# Patient Record
Sex: MALE | Race: WHITE | Employment: OTHER | URBAN - NONMETROPOLITAN AREA
[De-identification: names, ages, dates, MRNs, and addresses within clinical notes are randomized per-mention and may not be internally consistent; named-entity substitution may affect disease eponyms.]

---

## 2020-08-08 ENCOUNTER — HOSPITAL ENCOUNTER (INPATIENT)
Age: 40
LOS: 9 days | Discharge: HOME | DRG: 885 | End: 2020-08-17
Attending: PSYCHIATRY & NEUROLOGY | Admitting: PSYCHIATRY & NEUROLOGY
Payer: OTHER GOVERNMENT

## 2020-08-08 PROBLEM — F32.3 MAJOR DEPRESSION WITH PSYCHOTIC FEATURES (HCC): Status: ACTIVE | Noted: 2020-08-08

## 2020-08-08 LAB
ACETAMINOPHEN LEVEL: < 5 UG/ML (ref 0–20)
ALBUMIN SERPL-MCNC: 4.9 G/DL (ref 3.5–5.1)
ALP BLD-CCNC: 112 U/L (ref 38–126)
ALT SERPL-CCNC: 25 U/L (ref 11–66)
AMPHETAMINE+METHAMPHETAMINE URINE SCREEN: NEGATIVE
ANION GAP SERPL CALCULATED.3IONS-SCNC: 13 MEQ/L (ref 8–16)
AST SERPL-CCNC: 33 U/L (ref 5–40)
BACTERIA: ABNORMAL /HPF
BARBITURATE QUANTITATIVE URINE: NEGATIVE
BASOPHILS # BLD: 0.9 %
BASOPHILS ABSOLUTE: 0.1 THOU/MM3 (ref 0–0.1)
BENZODIAZEPINE QUANTITATIVE URINE: NEGATIVE
BILIRUB SERPL-MCNC: 0.7 MG/DL (ref 0.3–1.2)
BILIRUBIN URINE: NEGATIVE
BLOOD, URINE: ABNORMAL
BUN BLDV-MCNC: 16 MG/DL (ref 7–22)
CALCIUM SERPL-MCNC: 9.4 MG/DL (ref 8.5–10.5)
CANNABINOID QUANTITATIVE URINE: NEGATIVE
CASTS 2: ABNORMAL /LPF
CASTS UA: ABNORMAL /LPF
CHARACTER, URINE: CLEAR
CHLORIDE BLD-SCNC: 104 MEQ/L (ref 98–111)
CO2: 25 MEQ/L (ref 23–33)
COCAINE METABOLITE QUANTITATIVE URINE: NEGATIVE
COLOR: YELLOW
CREAT SERPL-MCNC: 0.8 MG/DL (ref 0.4–1.2)
CRYSTALS, UA: ABNORMAL
EOSINOPHIL # BLD: 3.6 %
EOSINOPHILS ABSOLUTE: 0.3 THOU/MM3 (ref 0–0.4)
EPITHELIAL CELLS, UA: ABNORMAL /HPF
ERYTHROCYTE [DISTWIDTH] IN BLOOD BY AUTOMATED COUNT: 12.5 % (ref 11.5–14.5)
ERYTHROCYTE [DISTWIDTH] IN BLOOD BY AUTOMATED COUNT: 42.7 FL (ref 35–45)
ETHYL ALCOHOL, SERUM: < 0.01 %
GFR SERPL CREATININE-BSD FRML MDRD: > 90 ML/MIN/1.73M2
GLUCOSE BLD-MCNC: 94 MG/DL (ref 70–108)
GLUCOSE URINE: NEGATIVE MG/DL
HCT VFR BLD CALC: 44.6 % (ref 42–52)
HEMOGLOBIN: 15 GM/DL (ref 14–18)
IMMATURE GRANS (ABS): 0.02 THOU/MM3 (ref 0–0.07)
IMMATURE GRANULOCYTES: 0.3 %
KETONES, URINE: NEGATIVE
LEUKOCYTE ESTERASE, URINE: NEGATIVE
LYMPHOCYTES # BLD: 25.9 %
LYMPHOCYTES ABSOLUTE: 1.9 THOU/MM3 (ref 1–4.8)
MCH RBC QN AUTO: 31.3 PG (ref 26–33)
MCHC RBC AUTO-ENTMCNC: 33.6 GM/DL (ref 32.2–35.5)
MCV RBC AUTO: 93.1 FL (ref 80–94)
MISCELLANEOUS 2: ABNORMAL
MONOCYTES # BLD: 5.7 %
MONOCYTES ABSOLUTE: 0.4 THOU/MM3 (ref 0.4–1.3)
NITRITE, URINE: NEGATIVE
NUCLEATED RED BLOOD CELLS: 0 /100 WBC
OPIATES, URINE: NEGATIVE
OSMOLALITY CALCULATION: 284.1 MOSMOL/KG (ref 275–300)
OXYCODONE: NEGATIVE
PH UA: 6 (ref 5–9)
PHENCYCLIDINE QUANTITATIVE URINE: NEGATIVE
PLATELET # BLD: 299 THOU/MM3 (ref 130–400)
PMV BLD AUTO: 10.1 FL (ref 9.4–12.4)
POTASSIUM REFLEX MAGNESIUM: 3.8 MEQ/L (ref 3.5–5.2)
PROLACTIN: 4 NG/ML
PROTEIN UA: 30
RBC # BLD: 4.79 MILL/MM3 (ref 4.7–6.1)
RBC URINE: ABNORMAL /HPF
RENAL EPITHELIAL, UA: ABNORMAL
SALICYLATE, SERUM: < 0.3 MG/DL (ref 2–10)
SEG NEUTROPHILS: 63.6 %
SEGMENTED NEUTROPHILS ABSOLUTE COUNT: 4.8 THOU/MM3 (ref 1.8–7.7)
SODIUM BLD-SCNC: 142 MEQ/L (ref 135–145)
SPECIFIC GRAVITY, URINE: 1.02 (ref 1–1.03)
TOTAL PROTEIN: 8 G/DL (ref 6.1–8)
TSH SERPL DL<=0.05 MIU/L-ACNC: 2.02 UIU/ML (ref 0.4–4.2)
UROBILINOGEN, URINE: 0.2 EU/DL (ref 0–1)
WBC # BLD: 7.5 THOU/MM3 (ref 4.8–10.8)
WBC UA: ABNORMAL /HPF
YEAST: ABNORMAL

## 2020-08-08 PROCEDURE — 84146 ASSAY OF PROLACTIN: CPT

## 2020-08-08 PROCEDURE — 99284 EMERGENCY DEPT VISIT MOD MDM: CPT

## 2020-08-08 PROCEDURE — 85025 COMPLETE CBC W/AUTO DIFF WBC: CPT

## 2020-08-08 PROCEDURE — 84443 ASSAY THYROID STIM HORMONE: CPT

## 2020-08-08 PROCEDURE — 81001 URINALYSIS AUTO W/SCOPE: CPT

## 2020-08-08 PROCEDURE — G0480 DRUG TEST DEF 1-7 CLASSES: HCPCS

## 2020-08-08 PROCEDURE — 80053 COMPREHEN METABOLIC PANEL: CPT

## 2020-08-08 PROCEDURE — 80307 DRUG TEST PRSMV CHEM ANLYZR: CPT

## 2020-08-08 PROCEDURE — 36415 COLL VENOUS BLD VENIPUNCTURE: CPT

## 2020-08-08 PROCEDURE — 1240000000 HC EMOTIONAL WELLNESS R&B

## 2020-08-08 PROCEDURE — 99285 EMERGENCY DEPT VISIT HI MDM: CPT

## 2020-08-08 PROCEDURE — 6370000000 HC RX 637 (ALT 250 FOR IP): Performed by: PSYCHIATRY & NEUROLOGY

## 2020-08-08 RX ORDER — IBUPROFEN 400 MG/1
400 TABLET ORAL EVERY 6 HOURS PRN
Status: DISCONTINUED | OUTPATIENT
Start: 2020-08-08 | End: 2020-08-17 | Stop reason: HOSPADM

## 2020-08-08 RX ORDER — GABAPENTIN 100 MG/1
100 CAPSULE ORAL 3 TIMES DAILY
Status: DISCONTINUED | OUTPATIENT
Start: 2020-08-08 | End: 2020-08-17 | Stop reason: HOSPADM

## 2020-08-08 RX ORDER — ZIPRASIDONE MESYLATE 20 MG/ML
10 INJECTION, POWDER, LYOPHILIZED, FOR SOLUTION INTRAMUSCULAR 3 TIMES DAILY PRN
Status: DISCONTINUED | OUTPATIENT
Start: 2020-08-08 | End: 2020-08-17 | Stop reason: HOSPADM

## 2020-08-08 RX ORDER — TRAZODONE HYDROCHLORIDE 50 MG/1
50 TABLET ORAL NIGHTLY
Status: ON HOLD | COMMUNITY
End: 2020-08-17 | Stop reason: HOSPADM

## 2020-08-08 RX ORDER — ACETAMINOPHEN 325 MG/1
650 TABLET ORAL EVERY 4 HOURS PRN
Status: DISCONTINUED | OUTPATIENT
Start: 2020-08-08 | End: 2020-08-17 | Stop reason: HOSPADM

## 2020-08-08 RX ORDER — TRAZODONE HYDROCHLORIDE 50 MG/1
50 TABLET ORAL NIGHTLY PRN
Status: DISCONTINUED | OUTPATIENT
Start: 2020-08-08 | End: 2020-08-17 | Stop reason: HOSPADM

## 2020-08-08 RX ORDER — SERTRALINE HYDROCHLORIDE 100 MG/1
50 TABLET, FILM COATED ORAL DAILY
Status: ON HOLD | COMMUNITY
End: 2020-08-17 | Stop reason: HOSPADM

## 2020-08-08 RX ORDER — POLYETHYLENE GLYCOL 3350 17 G/17G
17 POWDER, FOR SOLUTION ORAL DAILY PRN
Status: DISCONTINUED | OUTPATIENT
Start: 2020-08-08 | End: 2020-08-17 | Stop reason: HOSPADM

## 2020-08-08 RX ORDER — HYDROXYZINE PAMOATE 50 MG/1
50 CAPSULE ORAL 2 TIMES DAILY PRN
Status: ON HOLD | COMMUNITY
End: 2020-08-17 | Stop reason: HOSPADM

## 2020-08-08 RX ORDER — RISPERIDONE 2 MG/1
2 TABLET, FILM COATED ORAL NIGHTLY
Status: ON HOLD | COMMUNITY
End: 2020-08-17 | Stop reason: HOSPADM

## 2020-08-08 RX ORDER — RISPERIDONE 2 MG/1
4 TABLET, FILM COATED ORAL NIGHTLY
Status: DISCONTINUED | OUTPATIENT
Start: 2020-08-08 | End: 2020-08-17 | Stop reason: HOSPADM

## 2020-08-08 RX ORDER — HYDROXYZINE HYDROCHLORIDE 25 MG/1
50 TABLET, FILM COATED ORAL 3 TIMES DAILY PRN
Status: DISCONTINUED | OUTPATIENT
Start: 2020-08-08 | End: 2020-08-17 | Stop reason: HOSPADM

## 2020-08-08 RX ORDER — SERTRALINE HYDROCHLORIDE 100 MG/1
100 TABLET, FILM COATED ORAL DAILY
Status: DISCONTINUED | OUTPATIENT
Start: 2020-08-08 | End: 2020-08-11

## 2020-08-08 RX ORDER — GABAPENTIN 100 MG/1
100 CAPSULE ORAL 3 TIMES DAILY
COMMUNITY

## 2020-08-08 RX ADMIN — HYDROXYZINE HYDROCHLORIDE 50 MG: 25 TABLET ORAL at 20:10

## 2020-08-08 RX ADMIN — RISPERIDONE 4 MG: 2 TABLET ORAL at 20:10

## 2020-08-08 RX ADMIN — SERTRALINE HYDROCHLORIDE 100 MG: 100 TABLET, FILM COATED ORAL at 20:10

## 2020-08-08 RX ADMIN — GABAPENTIN 100 MG: 100 CAPSULE ORAL at 20:10

## 2020-08-08 RX ADMIN — IBUPROFEN 400 MG: 400 TABLET, FILM COATED ORAL at 20:10

## 2020-08-08 SDOH — HEALTH STABILITY: MENTAL HEALTH: HOW OFTEN DO YOU HAVE A DRINK CONTAINING ALCOHOL?: NEVER

## 2020-08-08 ASSESSMENT — SLEEP AND FATIGUE QUESTIONNAIRES
DO YOU HAVE DIFFICULTY SLEEPING: YES
DO YOU HAVE DIFFICULTY SLEEPING: NO
DO YOU USE A SLEEP AID: NO
DIFFICULTY ARISING: YES
RESTFUL SLEEP: NO
DO YOU USE A SLEEP AID: NO
AVERAGE NUMBER OF SLEEP HOURS: 7
DIFFICULTY FALLING ASLEEP: YES
SLEEP PATTERN: DIFFICULTY FALLING ASLEEP;RESTLESSNESS;NIGHTMARES/TERRORS
DIFFICULTY STAYING ASLEEP: YES
AVERAGE NUMBER OF SLEEP HOURS: 7

## 2020-08-08 ASSESSMENT — PAIN DESCRIPTION - PROGRESSION: CLINICAL_PROGRESSION: NOT CHANGED

## 2020-08-08 ASSESSMENT — ENCOUNTER SYMPTOMS
SORE THROAT: 0
BACK PAIN: 0
SHORTNESS OF BREATH: 0
ABDOMINAL PAIN: 0
COUGH: 0
COLOR CHANGE: 0

## 2020-08-08 ASSESSMENT — PAIN - FUNCTIONAL ASSESSMENT: PAIN_FUNCTIONAL_ASSESSMENT: ACTIVITIES ARE NOT PREVENTED

## 2020-08-08 ASSESSMENT — PAIN DESCRIPTION - FREQUENCY: FREQUENCY: INTERMITTENT

## 2020-08-08 ASSESSMENT — PAIN DESCRIPTION - DESCRIPTORS: DESCRIPTORS: ACHING;DISCOMFORT

## 2020-08-08 ASSESSMENT — PAIN SCALES - GENERAL
PAINLEVEL_OUTOF10: 3
PAINLEVEL_OUTOF10: 3
PAINLEVEL_OUTOF10: 0

## 2020-08-08 ASSESSMENT — PATIENT HEALTH QUESTIONNAIRE - PHQ9
SUM OF ALL RESPONSES TO PHQ QUESTIONS 1-9: 21
SUM OF ALL RESPONSES TO PHQ QUESTIONS 1-9: 21

## 2020-08-08 ASSESSMENT — LIFESTYLE VARIABLES
HISTORY_ALCOHOL_USE: NO
HISTORY_ALCOHOL_USE: NO

## 2020-08-08 ASSESSMENT — PAIN DESCRIPTION - ONSET: ONSET: ON-GOING

## 2020-08-08 ASSESSMENT — PAIN DESCRIPTION - PAIN TYPE: TYPE: CHRONIC PAIN

## 2020-08-08 ASSESSMENT — PAIN DESCRIPTION - ORIENTATION: ORIENTATION: LOWER

## 2020-08-08 ASSESSMENT — PAIN DESCRIPTION - LOCATION: LOCATION: BACK

## 2020-08-08 NOTE — ED NOTES
ED to inpatient nurses report    Chief Complaint   Patient presents with    Suicidal      Present to ED from home  LOC: alert and orientated to name, place, date  Vital signs   Vitals:    08/08/20 0956   BP: (!) 145/97   Pulse: 75   Resp: 15   Temp: 97.8 °F (36.6 °C)   TempSrc: Oral   SpO2: 99%   Weight: 160 lb (72.6 kg)   Height: 5' 10\" (1.778 m)      Oxygen Baseline 99    Current needs required room air   LDAs:    Mobility: Independent  Pending ED orders: None  Present condition: Stable    Electronically signed by Bela Robins RN on 8/8/2020 at 2:16 PM       Bela Robins RN  08/08/20 5652

## 2020-08-08 NOTE — FLOWSHEET NOTE
08/08/20 1820   Provider Notification   Reason for Communication Review case;New orders   Provider Name Dr. Daniela Danielle   Provider Notification Physician   Method of Communication Call   Response See orders   Spoke with Dr. Daniela Danielle about home medications, orders received resume home medications, increase Zoloft 100 mg daily and Risperdal 4 mg nightly.   IM Geodon 10 mg TID PRN for agitation

## 2020-08-08 NOTE — ED PROVIDER NOTES
[facility]  eMERGENCY dEPARTMENT eNCOUnter          CHIEF COMPLAINT       Chief Complaint   Patient presents with    Suicidal       Nurses Notes reviewed and I agree except as noted inthe HPI. HISTORY OF PRESENT ILLNESS    Chrystal Mckeon is a 36 y.o. male who presents to the Emergency Department for the evaluation of suicidal ideation and confusion. Patient states that he has prior history of anxiety and depression. He states he goes through periods of feeling better and approximately 1 month ago felt well and stopped taking his psychiatric medications. Family at bedside noticed a decline in his mental state approximately 10 days ago and found out he been off of his medications for 2 to 3 weeks at that point time. Approximately 8 to 9 days ago, they restarted the medications and when they consulted with his mental health provider at the [place] 4 days ago, medications were decreased. Family reports that he is typically on gabapentin 300 mg 3 times daily, sertraline 100 mg daily, hydroxyzine 50 mg 3 times daily as needed, trazodone 50 mg at bedtime and Risperdal 2 mg at bedtime. They discontinued the trazodone and Risperdal.  His gabapentin was decreased to 100 mg 3 times daily, sertraline 50 mg daily and hydroxyzine twice daily as needed, which he is only taking at bedtime. Patient reports he has been having confusion but is unable to really elaborate on this. He states that he is making bad decisions which she also cannot elaborate on. He states he finds out after making these decisions that they are bad and clarifies these decisions as \"normal stuff throughout the day. He cannot give any examples. He states that he feels bad in regards to these decisions and cannot say if these decisions have potential to cause harm to himself or others. He states is difficult for him to get any piece from these thoughts and he has been restless.   He has describes some cyclical control, pervasive thoughts to family that were persistent enough a few days ago that he got out of the car as it was moving through the drive-through, stating he felt like he was in a game. Aunt reports a lot of pacing behavior at night that seems to be worse shortly before he is due for his next dose of psychiatric medications. She reports approximately 8 hours at night of increased confusion, anxiety, paranoia, restlessness with multiple panic attacks that improves after he gets his morning medications. She also reports that the patient has tried getting into several different vehicles in their neighborhood, believing he could get down to [place] to see his children. Aunt reports that this has caused fear in at least 1 of the individuals when he tried to get into their car. He is supposed to go down there with his father this coming week for 2 weeks. Patient has been rearranging the garage throughout the day. He states that yesterday evening the thoughts were more persistent and he was very confused but cannot say what he was confused about, just that he did not know what to do. He cut his left wrist last night which according to family is the first time this is ever been done. He cannot say whether or not this was a suicide attempt but states he was having suicidal thoughts around the same time. He states he does not know if he had a plan and denies any current suicidal ideation. He has history of suicide attempt 1 year ago with cutting as well as 3 to 6 months ago when he put a garden hose in the exhaust of his car in the garage. He denies any homicidal ideation. He denies any auditory hallucinations and states he is unsure if he is having any visual hallucinations. Family does express concern that he seems to struggle to swallow pills and they are wondering if any of his medications may be available in a liquid form. No physical concerns or complaints at this time and no recent illness or injury.       The HPI was provided by the patient. REVIEW OF SYSTEMS     Review of Systems   Constitutional: Negative for chills and fever. HENT: Negative for sore throat. Respiratory: Negative for cough and shortness of breath. Gastrointestinal: Negative for abdominal pain. Musculoskeletal: Negative for back pain. Skin: Negative for color change. Neurological: Negative for headaches. Psychiatric/Behavioral: Positive for decreased concentration, self-injury and suicidal ideas. The patient is nervous/anxious and is hyperactive. PAST MEDICAL HISTORY    has a past medical history of \"Kidney problem\", Anxiety, Depression, and Neuropathy. SURGICAL HISTORY      has no past surgical history on file. CURRENT MEDICATIONS       Current Discharge Medication List      CONTINUE these medications which have NOT CHANGED    Details   gabapentin (NEURONTIN) 100 MG capsule Take 100 mg by mouth 3 times daily. sertraline (ZOLOFT) 100 MG tablet Take 50 mg by mouth daily      hydrOXYzine (VISTARIL) 50 MG capsule Take 50 mg by mouth 2 times daily as needed for Anxiety Patient taking only at bedtime             ALLERGIES     has No Known Allergies. FAMILY HISTORY     has no family status information on file. family history is not on file. SOCIAL HISTORY      reports that he has never smoked. He has never used smokeless tobacco. He reports previous alcohol use. He reports that he does not use drugs. PHYSICAL EXAM     INITIAL VITALS:  height is 5' 10\" (1.778 m) and weight is 160 lb (72.6 kg). His oral temperature is 97.8 °F (36.6 °C). His blood pressure is 145/97 (abnormal) and his pulse is 75. His respiration is 15 and oxygen saturation is 99%. Physical Exam  Vitals signs and nursing note reviewed. Constitutional:       Appearance: Normal appearance. HENT:      Head: Normocephalic and atraumatic. Eyes:      Conjunctiva/sclera: Conjunctivae normal.   Cardiovascular:      Rate and Rhythm: Normal rate. Vitals:    08/08/20 0956   BP: (!) 145/97   Pulse: 75   Resp: 15   Temp: 97.8 °F (36.6 °C)   TempSrc: Oral   SpO2: 99%   Weight: 160 lb (72.6 kg)   Height: 5' 10\" (1.778 m)      11:43 AM EDT: The patient was seen and evaluated. Patient presents for evaluation after suicidal thoughts that occurred yesterday evening with self injury by self-inflicted laceration to the left wrist that he is unsure whether he would qualify it as a suicidal attempt. He denies any current or active suicidal ideation on my examination. He expresses concern for confusion which he has had in the past with mental health issues. He denies any recent illness or injury and laboratory results are reassuring. He is evaluated by behavioral access center. Psychiatry recommendation was for inpatient psychiatric treatment and patient is willing to sign voluntary statement to proceed with the recommended plan of care. He was admitted to the psychiatric unit for further management. CRITICAL CARE:   None    CONSULTS:  RITA     PROCEDURES:  None    FINAL IMPRESSION      1. Major depression with psychotic features [facility])          DISPOSITION/PLAN   Admit    PATIENT REFERRED TO:  Alberto Barfield, NADER - CNP  [address]  [facility] [id number]  [phone]            DISCHARGEMEDICATIONS:  Current Discharge Medication List          (Please note that portions of this note were completedwith a voice recognition program.  Efforts were made to edit the dictations but occasionally words are mis-transcribed.)        Argenis Engle PA-C  08/08/20 2443

## 2020-08-08 NOTE — ED TRIAGE NOTES
Patient presents with suicidal thoughts. States he has had these thoughts before. States he had a rough night but would not explain any further. Patient states last night he did cut himself on his arm a little bit.

## 2020-08-08 NOTE — ED NOTES
Patient placed in safe room that is ligature resistant with continuous monitoring in place. Provider notified, requested an assessment by behavioral health . Patient belongings secured in a locked lockers outside of the room. Explained suicide prevention precautions to the patient including constant observer.         Adriane Rocha RN  08/08/20 1016
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
## 2020-08-08 NOTE — PROGRESS NOTES
Oriented to unit and plan of care, consents signed. Patient ask \"so how do you do it?, this writer replied \"do what\", patient states \"lethal injection, how do you do it?\", this writer tried to explain that we do not give lethal injections to patients\", patient continues to say \"I know you are going to murder me, it doesn't matter, just get it over with\". Patient then ask Stacey John do you put patients down? \", this writer explained again, we do not put patients down, we are here to keep you safe\". Patient asking if we \"gas patients, where are the gas masks\". Patient continues to be paranoid.      Evelin Willams RN

## 2020-08-08 NOTE — PROGRESS NOTES
Patients Aunt Lindsey phoned in stating the patient \"has not been coping on the medications, last Tuesday they cut his medications 1/3-1/2 and cut some out completely, [facility], appointment on Tuesday with MD, Wednesday he is going to see his children in [place], I will be going with him to [place], he won't be alone, he needs to started back on his medications as soon as possible so he doesn't get any worse\".

## 2020-08-08 NOTE — PROGRESS NOTES
[address] Karley Ferrera provider who states that patient is medically cleared. [address] [facility] perfect serve to contact psychiatry. Will wait for call back from Dr. Rachna Butler. 1248 Call back from Dr. Rachna Butler. He recommends inpatient treatment. Finishing discharged on 4E so patient will have a spot on the unit.  1304 Voluntary admission form signed. Chip 0280 requested a release [facility] information for [facility]. Patient agreeable. Contact [facility] and Chuy Davis who filled out RAUL. Now patient refusing to sign. RAUL in control room of safe rooms if patient changes his mind. 1346 Call to 4E. Discharges are complete but rooms still need to be cleaned. Heladio Munoz stated that they would be ready to receive report in about 20 minutes. Clinician will call back. [id number] [facility] requesting RAUL paper as patient states he will now sign it. RAUL signed by paper. Will send it up to 4E with the patient. 1414 Report given to Romelia Jain on 4E. Informed staff of RAUL for [place]. Called charge nurse who is going to have nurses report put in and then will transport upstairs. 1415 Updated medical provider. 1417 Informed patient that he would be transported upstairs shortly. Patient tearful. Explained that a nurse and campus security would escort him which is standard procedure. 1423 Patient being escorted to psychiatric unit at this time.

## 2020-08-08 NOTE — BH NOTE
Group Therapy Note    Date: 8/8/2020  Start Time: 1500  End Time:  1839  Number of Participants: 5    Type of Group: Psychoeducation      Group Goal:  Increase insight into self esteem, definition, who/what affects self esteem, why it is important, what is self talk, and made list of self esteem boosters versus busters. Notes:  Each patient was asked to give one positive trait they liked about self=n/a and   rate their self esteem today on scale of #1-10 with 10 the most= na.     Status After Intervention: n/a    Participation Level: Patient refused to attend (was getting admitted)    Participation Quality: n/a      Speech:  n/a      Thought Process/Content: n/a      Affective Functioning: n/a      Mood: n/a      Level of consciousness:  n/a      Response to Learning: n/a    Endings: N/A    Modes of Intervention: Education, Support and Socialization      Discipline Responsible: Registered Nurse      Signature:  ZARINA Powell RN MSN

## 2020-08-09 PROBLEM — F33.3 MDD (MAJOR DEPRESSIVE DISORDER), RECURRENT, SEVERE, WITH PSYCHOSIS (HCC): Chronic | Status: ACTIVE | Noted: 2020-08-08

## 2020-08-09 PROCEDURE — 1240000000 HC EMOTIONAL WELLNESS R&B

## 2020-08-09 PROCEDURE — 6370000000 HC RX 637 (ALT 250 FOR IP): Performed by: PSYCHIATRY & NEUROLOGY

## 2020-08-09 RX ADMIN — SERTRALINE HYDROCHLORIDE 100 MG: 100 TABLET, FILM COATED ORAL at 08:59

## 2020-08-09 RX ADMIN — RISPERIDONE 4 MG: 2 TABLET ORAL at 21:48

## 2020-08-09 RX ADMIN — HYDROXYZINE HYDROCHLORIDE 50 MG: 25 TABLET ORAL at 17:21

## 2020-08-09 RX ADMIN — GABAPENTIN 100 MG: 100 CAPSULE ORAL at 21:48

## 2020-08-09 ASSESSMENT — SLEEP AND FATIGUE QUESTIONNAIRES
SLEEP PATTERN: DIFFICULTY FALLING ASLEEP;RESTLESSNESS;NIGHTMARES/TERRORS
DIFFICULTY ARISING: YES
DIFFICULTY STAYING ASLEEP: YES
RESTFUL SLEEP: NO
DIFFICULTY FALLING ASLEEP: YES
DO YOU USE A SLEEP AID: NO
DO YOU HAVE DIFFICULTY SLEEPING: YES
AVERAGE NUMBER OF SLEEP HOURS: 7

## 2020-08-09 ASSESSMENT — LIFESTYLE VARIABLES: HISTORY_ALCOHOL_USE: NO

## 2020-08-09 ASSESSMENT — PAIN SCALES - GENERAL: PAINLEVEL_OUTOF10: 0

## 2020-08-09 ASSESSMENT — PATIENT HEALTH QUESTIONNAIRE - PHQ9: SUM OF ALL RESPONSES TO PHQ QUESTIONS 1-9: 21

## 2020-08-09 NOTE — PLAN OF CARE
Problem: Depressive Behavior With or Without Suicide Precautions:  Goal: Absence of self-harm  Description: Absence of self-harm  8/9/2020 1404 by Mark Salazar RN  Outcome: Met This Shift  Note: Remains free from self harming behaviors  8/9/2020 0009 by Thelma Varela LPN  Outcome: Met This Shift  Note: Pt free of self harm this shift      Problem: Depressive Behavior With or Without Suicide Precautions:  Goal: Ability to disclose and discuss suicidal ideas will improve  Description: Ability to disclose and discuss suicidal ideas will improve  8/9/2020 1404 by Mark Salazar RN  Outcome: Ongoing  Note: Pt denies suicidal thoughts, poor eye contact, minimal conversation and refused to answer assessment questions  8/9/2020 0009 by Thelma Varela LPN  Outcome: Met This Shift  Note: Denies SI this shift   Goal: Able to verbalize support systems  Description: Able to verbalize support systems  8/9/2020 1404 by Mark Salazar RN  Outcome: Ongoing  Note: Pt has an Aunt who calls to check on him but pt refused to speak with her on the phone  8/9/2020 0009 by Thelma Varela LPN  Outcome: Met This Shift  Note: Pt is able to verbalize support systems      Problem: Discharge Planning:  Goal: Discharged to appropriate level of care  Description: Discharged to appropriate level of care  8/9/2020 1404 by Mark Salazar RN  Outcome: Ongoing  Note: [facility] at 0930  8/9/2020 0009 by Thelma Varela LPN  Outcome: Ongoing  Note: D/C planning in process      Problem: Anxiety:  Goal: Level of anxiety will decrease  Description: Level of anxiety will decrease  8/9/2020 1404 by Mark Salazar RN  Outcome: Ongoing  Note: Pt refused to answer shift assessment questions regarding anxiety  8/9/2020 0009 by Thelma Varela LPN  Outcome: Met This Shift  Note: Pts states level of anxiety decreased this shift      Problem: KNOWLEDGE DEFICIT,EDUCATION,DISCHARGE PLAN  Goal: Knowledge - personal safety  8/9/2020 1404 by Sandi Mathis RN  Outcome: Ongoing  Note: Pt did not complete safety plan education provided on benefits and purpose of completing safety plan  8/9/2020 0009 by Briana Love LPN  Outcome: Ongoing  Note: Pt encouraged to complete personal safety plan prior to D/C      Problem:  Activity:  Goal: Physical symptoms of sleep deprivation will improve  Description: Physical symptoms of sleep deprivation will improve  8/9/2020 1404 by Sandi Mathis RN  Note: Pt slept 8.5 hrs continuous sleep  8/9/2020 0009 by Briana Love LPN  Outcome: Ongoing     Problem: Pain:  Goal: Pain level will decrease  Description: Pain level will decrease  Outcome: Ongoing  Goal: Control of acute pain  Description: Control of acute pain  Outcome: Ongoing  Note: No complaints voiced  Goal: Control of chronic pain  Description: Control of chronic pain  Outcome: Ongoing     Problem: Depressive Behavior With or Without Suicide Precautions:  Goal: Able to verbalize acceptance of life and situations over which he or she has no control  Description: Able to verbalize acceptance of life and situations over which he or she has no control  8/9/2020 1404 by Sandi Mathis RN  Outcome: Not Met This Shift  8/9/2020 0009 by Briana Love LPN  Outcome: Ongoing  Goal: Able to verbalize and/or display a decrease in depressive symptoms  Description: Able to verbalize and/or display a decrease in depressive symptoms  8/9/2020 1404 by Sandi Mathis RN  Outcome: Not Met This Shift  Note: Pt refused assessment/medications   8/9/2020 0009 by Briana Love LPN  Outcome: Ongoing  Goal: Patient specific goal  Description: Patient specific goal  8/9/2020 1404 by Sandi Mathis RN  Outcome: Not Met This Shift  Note: Pt declined to establish a short term is able to verbalize some reality based thinking this shift    Care plan reviewed with patient. Patient does not verbalize understanding of the plan of care and did not contribute to goal setting.

## 2020-08-09 NOTE — PATIENT CARE CONFERENCE
[facility]  Initial Interdisciplinary Treatment Plan NOTE    Review Date & Time: 8/9/2020 9:51 AM    Patient was in treatment team.  See Multidisciplinary Treatment Team sheet for participants. Admission Type:   Admission Type: Voluntary    Reason for admission:  Reason for Admission: \"did something I am ashamed off, I lost my family and kids, my job\"      Estimated Length of Stay Update:  3-5 days  Estimated Discharge Date Update: 3-5 days    PATIENT STRENGTHS:  Patient Strengths Strengths: No significant Physical Illness(per epic)  Patient Strengths and Limitations:Limitations: Difficult relationships / poor social skills, Tendency to isolate self, General negative or hopeless attitude about future/recovery, Hopeless about future(per epic)  Addictive Behavior:Addictive Behavior  In the past 3 months, have you felt or has someone told you that you have a problem with:  : None(per epic)  Do you have a history of Chemical Use?: No(per epic)  Do you have a history of Alcohol Use?: No(per epic)  Do you have a history of Street Drug Abuse?: No(per [facility])  Histroy of Prescripton Drug Abuse?: No(per epic)  Medical Problems:  Past Medical History:   Diagnosis Date    \"Kidney problem\"     Anxiety     Depression     Neuropathy        EDUCATION:   Learner Progress Toward Treatment Goals:  Patient not appropriate due to acuity of psychiatric symptoms, will re attempt when more appropriate. Method: unable     Outcome: No evidence of Learning    PATIENT GOALS: SHAREE - patient unable to participate in care planning due to acuity of psychiatric symptoms, will re-attempt when more appropriate. PLAN/TREATMENT RECOMMENDATIONS UPDATE:   1. What is the most important thing we can help you with while you are here? SHAREE  2. Who is your support system? SHAREE  3. Do you have follow-up providers? SHAREE  4. Do you have the ability to pay for your medications? SHAREE  5.  Where will you be residing when you leave the hospital? SHAREE  6. Will need a return to work slip or FMLA paper completion?  SHAREE      GOALS UPDATE:   Time frame for Short-Term Goals: ongoing    [facility], [facility] [address]

## 2020-08-09 NOTE — PROGRESS NOTES
[id number] pt did not attend goal group  [id number] pt did not attend wellness group  [phone] pt did not attend music group
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
## 2020-08-09 NOTE — PLAN OF CARE
Problem: Depressive Behavior With or Without Suicide Precautions:  Goal: Ability to disclose and discuss suicidal ideas will improve  Description: Ability to disclose and discuss suicidal ideas will improve  Outcome: Met This Shift  Note: Denies SI this shift   Goal: Able to verbalize support systems  Description: Able to verbalize support systems  Outcome: Met This Shift  Note: Pt is able to verbalize support systems   Goal: Absence of self-harm  Description: Absence of self-harm  Outcome: Met This Shift  Note: Pt free of self harm this shift   Goal: Participates in care planning  Description: Participates in care planning  Outcome: Met This Shift  Note: Pt does participate in care planning      Problem: Anxiety:  Goal: Level of anxiety will decrease  Description: Level of anxiety will decrease  Outcome: Met This Shift  Note: Pts states level of anxiety decreased this shift      Problem: Altered Mood, Psychotic Behavior:  Goal: Able to demonstrate trust by eating, participating in treatment and following staff's direction  Description: Able to demonstrate trust by eating, participating in treatment and following staff's direction  Outcome: Met This Shift  Note: Pt is able to demonstrate trust by eating, participating in tx and following staff direction   Goal: Able to verbalize reality based thinking  Description: Able to verbalize reality based thinking  Outcome: Met This Shift  Note: Pt is able to verbalize some reality based thinking this shift      Problem: Depressive Behavior With or Without Suicide Precautions:  Goal: Able to verbalize acceptance of life and situations over which he or she has no control  Description: Able to verbalize acceptance of life and situations over which he or she has no control  Outcome: Ongoing  Goal: Able to verbalize and/or display a decrease in depressive symptoms  Description: Able to verbalize and/or display a decrease in depressive symptoms  Outcome: Ongoing     Problem: Discharge Planning:  Goal: Discharged to appropriate level of care  Description: Discharged to appropriate level of care  Outcome: Ongoing  Note: D/C planning in process      Problem: KNOWLEDGE DEFICIT,EDUCATION,DISCHARGE PLAN  Goal: Knowledge - personal safety  Outcome: Ongoing  Note: Pt encouraged to complete personal safety plan prior to D/C      Problem: Altered Mood, Psychotic Behavior:  Goal: Able to verbalize decrease in frequency and intensity of hallucinations  Description: Able to verbalize decrease in frequency and intensity of hallucinations  Outcome: Ongoing  Note: Pt denies hallucinations but appears to be responding to internal stimuli      Problem: Activity:  Goal: Physical symptoms of sleep deprivation will improve  Description: Physical symptoms of sleep deprivation will improve  Outcome: Ongoing     Problem: Depressive Behavior With or Without Suicide Precautions:  Goal: Patient specific goal  Description: Patient specific goal  Outcome: Not Met This Shift  Note: Pt did not specify goal this shift    Care plan reviewed with patient. Patient does verbalize understanding of the plan of care and does contribute to goal setting.

## 2020-08-09 NOTE — PROGRESS NOTES
This RN has reviewed and agrees with MARISSA Elliott LPN's data collection and has collaborated with this LPN regarding the patient's care plan.

## 2020-08-10 PROCEDURE — 6370000000 HC RX 637 (ALT 250 FOR IP): Performed by: PSYCHIATRY & NEUROLOGY

## 2020-08-10 PROCEDURE — 1240000000 HC EMOTIONAL WELLNESS R&B

## 2020-08-10 RX ADMIN — GABAPENTIN 100 MG: 100 CAPSULE ORAL at 13:12

## 2020-08-10 RX ADMIN — RISPERIDONE 4 MG: 2 TABLET ORAL at 20:45

## 2020-08-10 RX ADMIN — SERTRALINE HYDROCHLORIDE 100 MG: 100 TABLET, FILM COATED ORAL at 07:48

## 2020-08-10 RX ADMIN — GABAPENTIN 100 MG: 100 CAPSULE ORAL at 20:45

## 2020-08-10 RX ADMIN — GABAPENTIN 100 MG: 100 CAPSULE ORAL at 07:48

## 2020-08-10 RX ADMIN — TRAZODONE HYDROCHLORIDE 50 MG: 50 TABLET ORAL at 20:45

## 2020-08-10 ASSESSMENT — PAIN SCALES - GENERAL
PAINLEVEL_OUTOF10: 0
PAINLEVEL_OUTOF10: 0

## 2020-08-10 NOTE — PATIENT CARE CONFERENCE
[address] [place] [address]  Day 3 Interdisciplinary Treatment Plan NOTE    Review Date & Time: 8/10/20 1042    Patient was not in treatment team    Admission Type:   Admission Type: Voluntary    Reason for admission:  Reason for Admission: \"did something I am ashamed off, I lost my family and kids, my job\"  Estimated Length of Stay Update:  3-5 days  Estimated Discharge Date Update: 3-5 days    PATIENT STRENGTHS:  Patient Strengths Strengths: No significant Physical Illness(per epic)  Patient Strengths and Limitations:Limitations: Difficult relationships / poor social skills, Tendency to isolate self, General negative or hopeless attitude about future/recovery, Hopeless about future(per epic)  Addictive Behavior:Addictive Behavior  In the past 3 months, have you felt or has someone told you that you have a problem with:  : None(per epic)  Do you have a history of Chemical Use?: No(per epic)  Do you have a history of Alcohol Use?: No(per epic)  Do you have a history of Street Drug Abuse?: No(per [facility])  Histroy of Prescripton Drug Abuse?: No(per epic)  Medical Problems:  Past Medical History:   Diagnosis Date    \"Kidney problem\"     Anxiety     Depression     Neuropathy        Risk:  Fall RiskTotal: 65  John Scale John Scale Score: 22  BVC Total: 0  Change in scores: No. Changes to plan of Care: No    Status EXAM:   Status and Exam  Normal: No  Facial Expression: Flat, Sad, Worried  Affect: Blunt  Level of Consciousness: Alert  Mood:Normal: No  Mood: Depressed, Anxious, Sad  Motor Activity:Normal: Yes  Motor Activity: Decreased  Interview Behavior: Evasive  Preception: [place] to Person, [place] to Time, [place] to Place, [place] to Situation  Attention:Normal: Yes  Attention: Unable to Concentrate  Thought Processes: Circumstantial  Thought Content:Normal: No  Thought Content: (appears paranoid)  Hallucinations: None  Delusions: No  Memory:Normal: No  Memory: Poor Recent  Insight and Judgment: No  Insight and Judgment: Poor Insight  Present Suicidal Ideation: No  Present Homicidal Ideation: No    Daily Assessment Last Entry:   Daily Sleep (WDL): Within Defined Limits         Patient Currently in Pain: No  Daily Nutrition (WDL): Within Defined Limits    Patient Monitoring:  Frequency of Checks: 4 times per hour, close    Psychiatric Symptoms:   Depression Symptoms  Depression Symptoms: Change in energy level, Feelings of helplessness  Anxiety Symptoms  Anxiety Symptoms: Generalized  Clara Symptoms  Clara Symptoms: No problems reported or observed. Psychosis Symptoms  Delusion Type: Paranoid    Suicide Risk CSSR-S:  1) Within the past month, have you wished you were dead or wished you could go to sleep and not wake up? : Yes(per epic)  2) Have you actually had any thoughts of killing yourself? : Yes(per epic)  3) Have you been thinking about how you might kill yourself? : Yes(per epic)  5) Have you started to work out or worked out the details of how to kill yourself? Do you intend to carry out this plan? : Yes(per epic)  6) Have you ever done anything, started to do anything, or prepared to do anything to end your life?: Yes(per epic)  Change in Result: No Change in Plan of care: No      EDUCATION:   Learner Progress Toward Treatment Goals: Reviewed results and recommendations of this team, Reviewed group plan and strategies, Reviewed signs, symptoms and risk of self harm and violent behavior and Reviewed goals and plan of care    Method: Individual    Outcome: Needs reinforcement and No evidence of Learning    PATIENT GOALS: SHAREE     PLAN/TREATMENT RECOMMENDATIONS UPDATE:  1. How are you progressing toward meeting your main treatment goal?  - SHAREE  2. Are there discharge barriers/lingering problems that need to be addressed?          - SHAREE      3. Do you have the ability to pay for your medications?             - SHAREE      4.   How is your group participation?             - Poor     GOALS UPDATE:   Time frame for Short-Term Goals: Daily      Anup Band, LSW

## 2020-08-10 NOTE — PROGRESS NOTES
Daily Progress Note  Doc Morley MD  8/10/2020    Reviewed patient's current plan of care and vital signs with nursing staff. Sleep:  8.5 hours last night  Attending groups: No  No reported Suicidal thought; No interaction with peers & staff; he denies hallucinations or delusions but appears very paranoid. Thought process is slightly better this morning. Very worried. He is taking all his meds. His aunt was able to come on the unit and is agreeable for patient to get treatment here. SUBJECTIVE:    Patient is feeling unchanged. SUICIDAL IDEATION denies suicidal ideation. Patient does not have medication side effects. ROS: Patient has new complaints:  No  Sleeping adequately:  Yes  Visitors: Yes    Mental Status Examination:  Patient is cooperative. Speech increased latency of response and soft. No abnormal movements, tics or mannerisms. Mood depressed, affect flat affect. Suicidal ideation Absent. Homicidal ideations Absent. Hallucinations Absent. Delusions Present -paranoid. Thought process Disorganized. Alert and oriented X 3. Attention and concentration fair. MEMORY intact. Insight and Judgement impaired judgment. Data   height is 5' 10\" (1.778 m) and weight is 171 lb (77.6 kg). His oral temperature is 97.6 °F (36.4 °C). His blood pressure is 126/64 and his pulse is 96. His respiration is 16 and oxygen saturation is 97%.    Labs:   Admission on 08/08/2020   Component Date Value Ref Range Status    WBC 08/08/2020 7.5  4.8 - 10.8 thou/mm3 Final    RBC 08/08/2020 4.79  4.70 - 6.10 mill/mm3 Final    Hemoglobin 08/08/2020 15.0  14.0 - 18.0 gm/dl Final    Hematocrit 08/08/2020 44.6  42.0 - 52.0 % Final    MCV 08/08/2020 93.1  80.0 - 94.0 fL Final    MCH 08/08/2020 31.3  26.0 - 33.0 pg Final    MCHC 08/08/2020 33.6  32.2 - 35.5 gm/dl Final    RDW-CV 08/08/2020 12.5  11.5 - 14.5 % Final    RDW-SD 08/08/2020 42.7  35.0 - 45.0 fL Final    Platelets 26/53/3786 299  130 - 400 thou/mm3 Final    MPV 08/08/2020 10.1  9.4 - 12.4 fL Final    Seg Neutrophils 08/08/2020 63.6  % Final    Lymphocytes 08/08/2020 25.9  % Final    Monocytes 08/08/2020 5.7  % Final    Eosinophils 08/08/2020 3.6  % Final    Basophils 08/08/2020 0.9  % Final    Immature Granulocytes 08/08/2020 0.3  % Final    Segs Absolute 08/08/2020 4.8  1.8 - 7.7 thou/mm3 Final    Lymphocytes Absolute 08/08/2020 1.9  1.0 - 4.8 thou/mm3 Final    Monocytes Absolute 08/08/2020 0.4  0.4 - 1.3 thou/mm3 Final    Eosinophils Absolute 08/08/2020 0.3  0.0 - 0.4 thou/mm3 Final    Basophils Absolute 08/08/2020 0.1  0.0 - 0.1 thou/mm3 Final    Immature Grans (Abs) 08/08/2020 0.02  0.00 - 0.07 thou/mm3 Final    nRBC 08/08/2020 0  /100 wbc Final    Performed at [address] [place] [address], [address]    Glucose 08/08/2020 94  70 - 108 mg/dL Final    CREATININE 08/08/2020 0.8  0.4 - 1.2 mg/dL Final    BUN 08/08/2020 16  7 - 22 mg/dL Final    Sodium 08/08/2020 142  135 - 145 meq/L Final    Potassium reflex Magnesium 08/08/2020 3.8  3.5 - 5.2 meq/L Final    Chloride 08/08/2020 104  98 - 111 meq/L Final    CO2 08/08/2020 25  23 - 33 meq/L Final    Calcium 08/08/2020 9.4  8.5 - 10.5 mg/dL Final    AST 08/08/2020 33  5 - 40 U/L Final    Alkaline Phosphatase 08/08/2020 112  38 - 126 U/L Final    Total Protein 08/08/2020 8.0  6.1 - 8.0 g/dL Final    Alb 08/08/2020 4.9  3.5 - 5.1 g/dL Final    Total Bilirubin 08/08/2020 0.7  0.3 - 1.2 mg/dL Final    ALT 08/08/2020 25  11 - 66 U/L Final    Performed at [address] [place] [address], [facility]    TSH 08/08/2020 2.020  0.400 - 4.20 uIU/mL Final    Performed at [address] [place] [address], [address]    ETHYL ALCOHOL, SERUM 08/08/2020 < 0.01  0.00 % Final    Performed at [address] [place] [address], [facility]    Salicylate, Serum 61/60/6424 < 0.3* 2.0 - 10.0 mg/dL Final    Performed at [facility] -(Chloride + CO2)  Performed at [address] [facility], [facility]      Osmolality Calc 08/08/2020 284.1  275.0 - 300 mOsmol/kg Final    Performed at [facility], [address]   Kathi Floyd Filt Rate 08/08/2020 >90  ml/min/1.73m2 Final    Comment: Stage Description                    GFR, ml/min/1.73 m2   -   At increased risk               > or = 60 (with chronic                                       kidney disease risk factors)   1   Normal or increased GFR         > or = 90   2   Mildly or decreased GFR         60 - 89   3   Moderately decreased GFR        30 - 59   4   Severely decreased GFR          15 - 29   5   Kidney failure                  <15 (or dialysis)  Estimated GFR calculated using abbreviated MDRD formula as  recommended by [employer]. Calculation based  upon serum creatinine and adjusted for age, gender & race. Jazmine. Internal Med., Vol. 139 (2) pg 137-147.   Performed at [facility], [address]      Glucose, Ur 08/08/2020 NEGATIVE  NEGATIVE mg/dl Final    Bilirubin Urine 08/08/2020 NEGATIVE  NEGATIVE Final    Ketones, Urine 08/08/2020 NEGATIVE  NEGATIVE Final    Specific Gravity, Urine 08/08/2020 1.019  1.002 - 1.03 Final    Blood, Urine 08/08/2020 TRACE* NEGATIVE Final    pH, UA 08/08/2020 6.0  5.0 - 9.0 Final    Protein, UA 08/08/2020 30* NEGATIVE Final    Urobilinogen, Urine 08/08/2020 0.2  0.0 - 1.0 eu/dl Final    Nitrite, Urine 08/08/2020 NEGATIVE  NEGATIVE Final    Leukocyte Esterase, Urine 08/08/2020 NEGATIVE  NEGATIVE Final    Color, UA 08/08/2020 YELLOW  STRAW-YELL Final    Character, Urine 08/08/2020 CLEAR  CLEAR-SL C Final    RBC, UA 08/08/2020 3-5  0-2/hpf /hpf Final    WBC, UA 08/08/2020 NONE SEEN  0-4/hpf /hpf Final    Epithelial Cells, UA 08/08/2020 NONE SEEN  3-5/hpf /hpf Final    Bacteria, UA 08/08/2020 NONE SEEN  FEW/NONE S /hpf Final    Casts UA 08/08/2020 NONE SEEN

## 2020-08-10 NOTE — BH NOTE
INPATIENT RECREATIONAL THERAPY  ADULT BEHAVIORAL SERVICES  EVALUATION    REFERRING PHYSICIAN:   Dr. Sher Encarnacion  DIAGNOSIS:   Major Depressive Disorder, Recurrent Severe with Psychosis  PRECAUTIONS:   Standard precautions    HISTORY OF PRESENT ILLNESS/INJURY:   Patient was admitted to the unit due to suicidal ideation and depression. Patient reported that he was hearing voices. Patient also cut his left arm prior to admission. Patient reported going through a divorce about one year ago and has relationship stress with his ex-wife. Patient reported poor concentration and appeared to have some thought blocking. Patient stated that he has stress due to not seeing his 2 daughters for several months because they live with their mother in [place]. Patient appeared depressed and anxious with some paranoia. PMH:  Please see medical chart for prior medical history, allergies, and medication    HISTORY OF PSYCHIATRIC TREATMENT:  IP:    MARTHA Bhatt  [facility]  [facility] [address]:   [birth year]  GENDER:   male  MARITAL STATUS:   with 2 daughters (1and 3years old)  EMPLOYMENT STATUS:   Unemployed - Patient was in [employer] for 20 years. LIVING SITUATION/SUPPORT:  Lives with his aunt and uncle. Patient reported that his aunt is supportive. EDUCATIONAL LEVEL:  HS graduate -  Patient was in [employer] for 20 years. MEDICATION/DRUG USE:  Noncompliant with medications     LEISURE INTERESTS:  family activities, spiritual activities, listening to music, watching TV/Movies  ACTIVITY PREFERENCE:   individual  ACTIVITY TYPES:   Active. Indoor. Outdoor. Passive. COGNITION:   A&0x3    COPING:   poor  ATTENTION:   poor  RELAXATION:   Patient appeared anxious and paranoid. SELF-ESTEEM:    poor  MOTIVATION:   fair    SOCIAL SKILLS:   fair  FRUSTRATION TOLERANCE:   fair  ATTENTION SEEKING:  Patient cut his left arm prior to admission.    COOPERATION:  Cooperative - guarded  AFFECT:  tearful  APPEARANCE: appropriate    HEARING:  No problems noted  VISION:   No problems noted   VERBAL COMMUNICATION:   Guarded - disorganized and thought blocking  WRITTEN COMMUNICATION:  Did not assess    COORDINATION:  No problems noted  MOBILITY:  Ambulates independently     GOALS:   Increase socialization by coming out of his room daily and attending all groups on the unit during his hospital stay.

## 2020-08-10 NOTE — PLAN OF CARE
Patient has attended one group so far today and has been in and out of his room at times this shift so he is working toward his socialization goal. Patient will be encouraged to attend more groups on the unit and to come out of his room more often to socialize with others daily during his hospital stay.

## 2020-08-10 NOTE — PLAN OF CARE
Problem: Activity:  Goal: Physical symptoms of sleep deprivation will improve  Description: Physical symptoms of sleep deprivation will improve  8/10/2020 0845 by Mayda Henry RN  Outcome: Met This Shift  Note: Patient slept  hours last night, reports they  slept well. Encourage patient not to take naps during the day, relax several hours before bed and to take prescribed sleep meds as ordered. Patient verbalized understanding. 8/10/2020 0826 by Mayda Henry RN  Outcome: Ongoing  8/10/2020 0021 by Tej Horn RN  Outcome: Ongoing  Note: Pt resting quietly in bed with no distress noted     Problem: Depressive Behavior With or Without Suicide Precautions:  Goal: Able to verbalize and/or display a decrease in depressive symptoms  Description: Able to verbalize and/or display a decrease in depressive symptoms  8/10/2020 0845 by Mayda Henry RN  Outcome: Ongoing  Note: Pt affect , sad and worried   8/10/2020 0826 by Mayda Henry RN  Outcome: Ongoing  Note: Pt affect flat, sad and worried . Pt denied any SI  8/10/2020 0021 by Tej Horn RN  Outcome: Not Met This Shift  Note: Pt flat, sad, withdrawn and paranoid and reports high anxiety  Goal: Ability to disclose and discuss suicidal ideas will improve  Description: Ability to disclose and discuss suicidal ideas will improve  8/10/2020 0845 by Mayda Henry RN  Outcome: Ongoing  Note: Pt denied any SI at this time  8/10/2020 1807 by Mayda Henry RN  Outcome: Ongoing  Note: Pt affect flat , sad and worried  8/10/2020 0021 by Tej Horn RN  Outcome: Ongoing  Note: Pt denies self harm thoughts  Goal: Able to verbalize support systems  Description: Able to verbalize support systems  8/10/2020 0845 by Mayda Henry RN  Outcome: Ongoing  Note: Patient reports his Aunt  as their support system. 8/10/2020 0826 by Mayda Henry RN  Outcome: Ongoing  Note: Patient reports his Aunt as their support system.    8/10/2020 0021 by Daryle Shack Benedict Mcclellan RN  Outcome: Ongoing  Note: Pt states his aunt Peyman Dennis is supportive  Goal: Absence of self-harm  Description: Absence of self-harm  8/10/2020 0845 by Craig Stevenson RN  Outcome: Ongoing  Note: No self harm behaviors were observed or reported so far this shift. Remains on every 15 minutes precautions for safety. 8/10/2020 0826 by Craig Stevenson RN  Outcome: Met This Shift  8/10/2020 0021 by Hussain Brumfield RN  Outcome: Ongoing  Note: Pt denies thoughts to harm self  Goal: Patient specific goal  Description: Patient specific goal  8/10/2020 0845 by Craig Stevenson RN  Outcome: Ongoing  Note: No goal set for today  8/10/2020 9976 by Craig Stevenson RN  Outcome: Ongoing  8/10/2020 0021 by Hussain Brumfield RN  Outcome: Not Met This Shift  Note: Pt did not attend group or goal setting  Goal: Participates in care planning  Description: Participates in care planning  8/10/2020 0845 by Craig Stevenson RN  Outcome: Ongoing  Note: Patient discussed treatment plan with physician/medical staff, attending group, and compliant with medications. 8/10/2020 0826 by Craig Stevenson RN  Outcome: Ongoing  8/10/2020 0021 by Hussain Brumfield RN  Outcome: Not Met This Shift  Note: Pt needs much encouragement to take medication, does not attend group and is hesitant with assessments. Pt did eat a snack     Problem: Discharge Planning:  Goal: Discharged to appropriate level of care  Description: Discharged to appropriate level of care  8/10/2020 0845 by Craig Stevenson RN  Outcome: Ongoing  Note: Patient voices no  needs before discharge. Patient plans to be discharged to home . Discharge planner working with patient to achieve optimal discharge plans, specific to individual needs.    8/10/2020 0826 by Craig Stevenson RN  Outcome: Ongoing  8/10/2020 0021 by Hussain Brumfield RN  Outcome: Ongoing  Note: Pt to be discharged back to his aunt Peyman Dennis and [facility]     Problem: Anxiety:  Goal: Level of anxiety will decrease  Description: Level of anxiety will decrease  8/10/2020 0845 by Shelly Morataya RN  Outcome: Ongoing  Note: Pt appears anxious   8/10/2020 0826 by Shelly Morataya RN  Outcome: Ongoing  8/10/2020 0021 by Jayesh Wright RN  Outcome: Ongoing  Note: Pt flat, sad, withdrawn and paranoid and reports high anxiety     Problem: KNOWLEDGE DEFICIT,EDUCATION,DISCHARGE PLAN  Goal: Knowledge - personal safety  8/10/2020 0845 by Shelly Morataya RN  Outcome: Ongoing  Note: Maintained in safe and secure environment. .   8/10/2020 1557 by Shelly Morataya RN  Outcome: Ongoing  8/10/2020 0021 by Jayesh Wright RN  Outcome: Ongoing  Note: Pt remains safe and free of harm     Problem: Altered Mood, Psychotic Behavior:  Goal: Able to demonstrate trust by eating, participating in treatment and following staff's direction  Description: Able to demonstrate trust by eating, participating in treatment and following staff's direction  8/10/2020 0845 by Shelly Morataya RN  Outcome: Ongoing  Note: Pt needs prompted to eat. Pt does follow instruction from staff but is hesitant   8/10/2020 0826 by Shelly Morataya RN  Outcome: Ongoing  8/10/2020 0021 by Jayesh Wright RN  Outcome: Ongoing  Note: Pt needs much encouragement to take medication, does not attend group and is hesitant with assessments.  Pt did eat a snack  Goal: Able to verbalize decrease in frequency and intensity of hallucinations  Description: Able to verbalize decrease in frequency and intensity of hallucinations  8/10/2020 0845 by Shelly Morataya RN  Outcome: Ongoing  Note: Pt denied any hallucinations at this time   8/10/2020 0926 by Shelly Morataya RN  Outcome: Ongoing  8/10/2020 0021 by Jayesh Wright RN  Outcome: Ongoing  Note: Pt denies hallucinations and is not seen interacting with internal stimuli  Goal: Able to verbalize reality based thinking  Description: Able to verbalize reality based thinking  8/10/2020 0845 by Shelly Morataya RN  Outcome:

## 2020-08-10 NOTE — GROUP NOTE
Group Therapy Note    Date: 8/10/2020    Group Start Time: 1630  Group End Time: 0582  Group Topic: Healthy Living/Wellness     Attendees: 10    Notes:  Patient did not attend group.     Discipline Responsible: Licensed Practical Nurse    Signature:  Nannette Villarreal LPN

## 2020-08-10 NOTE — PROGRESS NOTES
Group Therapy Note    Date: 8/10/2020  Start Time: 1330  End Time:  1430  Number of Participants: 8    Type of Group: Psychotherapy      Notes:  Pt was present for group. Peers explored the development of present day self perceptions. Discussed family of origin issues and the role in which the level of family dysfunction and messages, contributed to being externally driven for personal identification of self. Pts also explored those issues in relationship to mood instability. Although pt was present he was attentive but without participation he did leave group on occasion but did always return.      Status After Intervention:  Unchanged    Participation Level: None    Participation Quality: Attentive      Speech:  mute      Thought Process/Content: SHAREE      Affective Functioning: Congruent      Mood: euthymic      Level of consciousness:  Alert and Attentive      Response to Learning: Able to change behavior      Endings: None Reported    Modes of Intervention: Education, Support, Socialization, Exploration and Clarifying      Discipline Responsible: /Counselor      Signature:  ASHWINI Ortiz

## 2020-08-10 NOTE — PLAN OF CARE
Problem: Depressive Behavior With or Without Suicide Precautions:  Goal: Ability to disclose and discuss suicidal ideas will improve  Description: Ability to disclose and discuss suicidal ideas will improve  8/10/2020 0021 by Laurie Toscano RN  Outcome: Ongoing  Note: Pt denies self harm thoughts  8/9/2020 1404 by Mark Salazar RN  Outcome: Ongoing  Note: Pt denies suicidal thoughts, poor eye contact, minimal conversation and refused to answer assessment questions  Goal: Able to verbalize support systems  Description: Able to verbalize support systems  8/10/2020 0021 by Laurie Toscano RN  Outcome: Ongoing  Note: Pt states his aunt Lan Thapa is supportive  8/9/2020 1404 by Mark Salazar RN  Outcome: Ongoing  Note: Pt has an Aunt who calls to check on him but pt refused to speak with her on the phone  Goal: Absence of self-harm  Description: Absence of self-harm  8/10/2020 0021 by Laurie Toscano RN  Outcome: Ongoing  Note: Pt denies thoughts to harm self  8/9/2020 1404 by Mark Salazar RN  Outcome: Met This Shift  Note: Remains free from self harming behaviors     Problem: Discharge Planning:  Goal: Discharged to appropriate level of care  Description: Discharged to appropriate level of care  8/10/2020 0021 by Laurie Toscano RN  Outcome: Ongoing  Note: Pt to be discharged back to his aunt Lan Thapa and follow with [facility]  8/9/2020 1404 by Mark Salazar RN  Outcome: Ongoing  Note: [facility]     Problem: Anxiety:  Goal: Level of anxiety will decrease  Description: Level of anxiety will decrease  8/10/2020 0021 by Laurie Toscano RN  Outcome: Ongoing  Note: Pt flat, sad, withdrawn and paranoid and reports high anxiety  8/9/2020 1404 by Mark Salazar RN  Outcome: Ongoing  Note: Pt refused to answer shift assessment questions regarding anxiety     Problem: KNOWLEDGE DEFICIT,EDUCATION,DISCHARGE PLAN  Goal: Knowledge - personal safety  8/10/2020 0021 by Tej Horn RN  Outcome: Ongoing  Note: Pt remains safe and free of harm  8/9/2020 1404 by Nini Haynes RN  Outcome: Ongoing  Note: Pt did not complete safety plan education provided on benefits and purpose of completing safety plan     Problem: Altered Mood, Psychotic Behavior:  Goal: Able to demonstrate trust by eating, participating in treatment and following staff's direction  Description: Able to demonstrate trust by eating, participating in treatment and following staff's direction  8/10/2020 0021 by Tej Horn RN  Outcome: Ongoing  Note: Pt needs much encouragement to take medication, does not attend group and is hesitant with assessments. Pt did eat a snack  8/9/2020 1404 by Nini Haynes RN  Outcome: Not Met This Shift  Note: Pt is not eating meals, drinking fluids or attending groups. Pt remains in his room most of the shift  Goal: Able to verbalize decrease in frequency and intensity of hallucinations  Description: Able to verbalize decrease in frequency and intensity of hallucinations  8/10/2020 0021 by Tej Horn RN  Outcome: Ongoing  Note: Pt denies hallucinations and is not seen interacting with internal stimuli  8/9/2020 1404 by Nini Haynes RN  Outcome: Not Met This Shift  Note: Pt denies hallucinations and refused shift assessment  Goal: Able to verbalize reality based thinking  Description: Able to verbalize reality based thinking  8/10/2020 0021 by Tej Horn RN  Outcome: Ongoing  Note: Pt is alert and oriented, and states his paranoia and confusion  8/9/2020 1404 by Nini Haynes RN  Outcome: Not Met This Shift  Note: Pt oriented to person, place and looked at his white board for the date. Pt is disoriented to situation-pt states \"I am ok and just need discharged\"     Problem:  Activity:  Goal: Physical symptoms of sleep deprivation will improve  Description: Physical symptoms of sleep deprivation will improve  8/10/2020 0021 by Jayesh Wright RN  Outcome: Ongoing  Note: Pt resting quietly in bed with no distress noted  8/9/2020 1404 by Peyman Beckman RN  Note: Pt slept 8.5 hrs continuous sleep     Problem: Pain:  Goal: Pain level will decrease  Description: Pain level will decrease  8/10/2020 0021 by Jayesh Wright RN  Outcome: Ongoing  Note: Pt denies pain or discomfort  8/9/2020 1404 by Peyman Beckman RN  Outcome: Ongoing  Goal: Control of acute pain  Description: Control of acute pain  8/10/2020 0021 by Jayesh Wright RN  Outcome: Ongoing  Note: Pt denies pain or discomfort  8/9/2020 1404 by Peyman Beckman RN  Outcome: Ongoing  Note: No complaints voiced  Goal: Control of chronic pain  Description: Control of chronic pain  8/10/2020 0021 by Jayesh Wright RN  Outcome: Ongoing  Note: Pt denies pain or discomfort  8/9/2020 1404 by Peyman Beckman RN  Outcome: Ongoing     Problem: Depressive Behavior With or Without Suicide Precautions:  Goal: Able to verbalize and/or display a decrease in depressive symptoms  Description: Able to verbalize and/or display a decrease in depressive symptoms  8/10/2020 0021 by Jayesh Wright RN  Outcome: Not Met This Shift  Note: Pt flat, sad, withdrawn and paranoid and reports high anxiety  8/9/2020 1404 by Peyman Beckman RN  Outcome: Not Met This Shift  Note: Pt refused assessment/medications   Goal: Patient specific goal  Description: Patient specific goal  8/10/2020 0021 by Jayesh Wright RN  Outcome: Not Met This Shift  Note: Pt did not attend group or goal setting  8/9/2020 1404 by Peyman Beckman RN  Outcome: Not Met This Shift  Note: Pt declined to establish a short term goal  Goal: Participates in care planning  Description: Participates in care planning  8/10/2020 0021 by Jayesh Wright RN  Outcome: Not Met This Shift  Note: Pt needs much encouragement to take medication, does not attend group and is hesitant with assessments. Pt did eat a snack  8/9/2020 1404 by Beverly Esteves, RN  Outcome: Not Met This Shift  Note: Pt isolates in his room, refused shift assessment and all medications   Care plan reviewed with patient and verbalize understanding of the plan of care and contribute to goal setting.

## 2020-08-10 NOTE — H&P
[address] [place] [address]                              HISTORY AND PHYSICAL    PATIENT NAME: Dominick Lux                     :        1980  MED REC NO:   [id number]                           ROOM:       5463  ACCOUNT NO:   [de-identified]                           ADMIT DATE: 2020  PROVIDER:     David Kline M.D. IDENTIFYING INFORMATION:  The patient is a 77-year-old    male. He is the father of two girls. He lives with his aunt and uncle. He is unemployed. CHIEF COMPLAINT:  \"I had a traumatic event a year ago and it is hard to  bounce back. \"    HISTORY OF PRESENT ILLNESS:  The patient was brought to [facility] ED by his aunt. He cut himself superficially in his left  arm. He told the  in the emergency room that he hears  voices at times, but he is not sure if it is his own thoughts or not. He was not able to talk about circumstances that brought him to the  emergency room, but the  noted that he was feeling  depressed and hopeless and was feeling bad about himself and he has  trouble concentrating, etc.  I attempted to see him earlier and it was  not feasible since he wanted to lay in bed. He was in bed, crying. He  finally came to see me, but he can only give me very limited  information. He could not stop talking about missing his daughters who  are in [place]. He admits though that he is depressed, and he is crying a  lot throughout the assessment. He kept talking about his daughters and  he wanted to write them a letter, but his daughters are only 1and 3 years old. I decided to call his aunt who brought him to the emergency room to ask  for background information. According to the patient's aunt, he has no  history of depression until he got  about a year ago. She  reports that the divorce was unexpected and was very dramatic. She  feels since then he has been depressed. His aunt is upset since she  thought that the patient was supposed to be discharged tomorrow to follow up as  outpatient at the [facility] in [facility]. She says that she found out that the  patient stopped taking his psychotropics two to three weeks ago, and she  realized the patient started not doing well about 10 days ago. She  talked to him about starting his psychotropics back and last Tuesday,  she took him to [facility] in [place], but they recommended to lower his  psychotropics and to follow up with the [facility] here in [facility], but she  realized that he kept doing worse and was more depressed and was not  talking to family members. He also was crying a lot at home and was  very confused. Of note, during my assessment, his thought process was  disorganized. He can barely express himself. PAST PSYCHIATRIC HISTORY:  According to the patient's aunt, he was in  the [facility] inpatient in [address] [place] [address], . She also reports six  months ago, he committed suicide by carbon monoxide poisoning and may  have been in a [facility] in [place] or he went outpatient. Again, he  was doing well on his psychotropics and stopped taking them about two or  three weeks ago. It is not clear if he has been on other psychotropics  besides what he is taking currently. FAMILY HISTORY:  Paternal grandmother with depression. Two paternal  uncles with glioblastoma, one of them , and a maternal uncle with  brain cancer. His aunt is concerned that the patient may have brain  cancer himself since his paternal uncle who  from [address] [place] [address] was  in and out of psychiatric hospital for a year before he was diagnosed. His father had a history of drinking alcohol a lot. There is no family  history of suicide attempt. SOCIAL HISTORY:  The patient was born in [facility], raised mainly in [place], [place]. Parents are . His father is  and his mother  .   Parents live in [facility] [place].  He has a brother who lives  in [facility]. He graduated from high school. 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 He never  saw any combat. He did receive an honorable discharge from the service. He was  first time for four years and second time for about four  or five years. He has two daughters, 4 and 3, from his second ex-wife. He got divorce about a year ago, which has been very hard on him and the  divorce was again unexpected. After he retired from [employer], he did  work in [facility] in [employer] for a few weeks and also worked in [employer]  for a few weeks around 05/2020. He went back to [place] to be closer to  his children, but he was not. He decided to move back to [place] about six  weeks ago and since then, he has been staying with his aunt and uncle. He has no history of illicit drugs or alcohol. He does not smoke. No  legal trouble. Urine tox screen is negative. He attends a [employer] in [facility]. MEDICAL AND SURGICAL HISTORY:  Noncontributory. MEDICATIONS:  Gabapentin 100 mg three times a day, sertraline 50 mg  daily, hydroxyzine 50 mg twice a day, trazodone 50 mg at bedtime,  risperidone 2 mg at bedtime. ALLERGIES:  No known drug allergies. REVIEW OF SYSTEMS:  10-system review is negative except as noted above. PHYSICAL EXAMINATION:  HEENT:  Within normal limits. NECK:  Supple. No thyromegaly. CARDIOVASCULAR:  Normal sinus rhythm. No murmur or gallop on  auscultation. LUNGS:  Clear. No wheezing or rales on auscultation. ABDOMEN:  Soft. Bowel sounds are present. RECTAL/GENITAL:  Not examined. EXTREMITIES:  Full range of motion in all four extremities. Peripheral  pulses are present. NEUROLOGIC:  Cranial nerves II through XII are grossly intact. Reflexes  are equal bilaterally. MENTAL STATUS EXAMINATION:  The patient appears stated age, dressed in a  hospital gown. He has good eye contact.   Good hygiene. He is bold. Speech clear, not spontaneous, but coherent. He is tearful throughout  the interview. Mood depressed and affect restricted. He denies  suicidal or homicidal ideation. He reports hearing voices at times, and  his thought process is disorganized. He was having paranoid delusions  last night, believing that staff will put him down. He is alert and  oriented x3. He has no mood swings, no flight of ideas, and no racing  thoughts. He has fair attention and concentration. Memory appears to  be intact as tested within the context of the interview. Intelligence  appears average. Judgment and insight are poor. DIAGNOSES:  1.  Major depressive disorder, recurrent, severe, with psychotic  features. 2.  Rule out schizoaffective disorder, depressive type. 3.  Relationship issues with his ex-wife, noncompliance with  psychotropics. RECOMMENDATIONS:  1. Admit to the unit. 2.  Routine labs ordered. 3.  Resume home medications, but increase sertraline, hydroxyzine, and  risperidone. 4.  Risks and benefits of psychotropics will be discussed as well as  alternative treatment. 5.  Consider brain MRI due to family history of glioblastoma. 6.  Support and reassurance given. 7.  Milieu and group therapy to develop insight to psychiatric illness  and better coping mechanism. 8.  I may consider transfer to [address] [place] [address] in [address] [place] [address] per aunt's  request.  9. Upon discharge, he will be referred for outpatient management. PATIENT'S STRENGTH:  His aunt.         Yulia Marroquin M.D.    D: 08/09/2020 16:59:56       T: 08/09/2020 18:20:44     [email or web address]  Job#: [id number]     Doc#: [id number]    CC:

## 2020-08-11 PROCEDURE — 6370000000 HC RX 637 (ALT 250 FOR IP): Performed by: PSYCHIATRY & NEUROLOGY

## 2020-08-11 PROCEDURE — 1240000000 HC EMOTIONAL WELLNESS R&B

## 2020-08-11 RX ORDER — SERTRALINE HYDROCHLORIDE 100 MG/1
100 TABLET, FILM COATED ORAL ONCE
Status: COMPLETED | OUTPATIENT
Start: 2020-08-11 | End: 2020-08-11

## 2020-08-11 RX ORDER — SERTRALINE HYDROCHLORIDE 100 MG/1
200 TABLET, FILM COATED ORAL DAILY
Status: DISCONTINUED | OUTPATIENT
Start: 2020-08-12 | End: 2020-08-17 | Stop reason: HOSPADM

## 2020-08-11 RX ADMIN — GABAPENTIN 100 MG: 100 CAPSULE ORAL at 20:56

## 2020-08-11 RX ADMIN — TRAZODONE HYDROCHLORIDE 50 MG: 50 TABLET ORAL at 20:56

## 2020-08-11 RX ADMIN — GABAPENTIN 100 MG: 100 CAPSULE ORAL at 13:47

## 2020-08-11 RX ADMIN — GABAPENTIN 100 MG: 100 CAPSULE ORAL at 07:38

## 2020-08-11 RX ADMIN — SERTRALINE HYDROCHLORIDE 100 MG: 100 TABLET, FILM COATED ORAL at 11:03

## 2020-08-11 RX ADMIN — RISPERIDONE 4 MG: 2 TABLET ORAL at 20:56

## 2020-08-11 RX ADMIN — SERTRALINE HYDROCHLORIDE 100 MG: 100 TABLET, FILM COATED ORAL at 07:39

## 2020-08-11 ASSESSMENT — PAIN SCALES - GENERAL: PAINLEVEL_OUTOF10: 0

## 2020-08-11 NOTE — PLAN OF CARE
Problem: Depressive Behavior With or Without Suicide Precautions:  Goal: Able to verbalize and/or display a decrease in depressive symptoms  Description: Able to verbalize and/or display a decrease in depressive symptoms  Outcome: Ongoing  Note: Patient noted with a blunt affect and brightens slightly with interaction. Patient states he continues to feel very depressed. Goal: Ability to disclose and discuss suicidal ideas will improve  Description: Ability to disclose and discuss suicidal ideas will improve  Outcome: Ongoing  Note: Patient denies any suicidal thoughts so far this shift. Goal: Able to verbalize support systems  Description: Able to verbalize support systems  Outcome: Ongoing  Note: Patient states his family is his current support system. Goal: Absence of self-harm  Description: Absence of self-harm  Outcome: Ongoing  Note: No self harm noted so far this shift. Goal: Patient specific goal  Description: Patient specific goal  Outcome: Ongoing  Note: Patient did not state a goal today. Goal: Participates in care planning  Description: Participates in care planning  Outcome: Ongoing  Note: Care plan reviewed with patient and limited understanding verbalized. Problem: Altered Mood, Psychotic Behavior:  Goal: Able to demonstrate trust by eating, participating in treatment and following staff's direction  Description: Able to demonstrate trust by eating, participating in treatment and following staff's direction  Outcome: Ongoing  Note: Patient took 100% of an evening snack and was cooperative with staff's requests. Goal: Able to verbalize decrease in frequency and intensity of hallucinations  Description: Able to verbalize decrease in frequency and intensity of hallucinations  Outcome: Ongoing  Note: Patient denies any hallucinations so far this shift.   Goal: Able to verbalize reality based thinking  Description: Able to verbalize reality based thinking  Outcome: Ongoing  Note: Patient is alert and oriented times 4. Problem: Discharge Planning:  Goal: Discharged to appropriate level of care  Description: Discharged to appropriate level of care  Outcome: Ongoing  Note: Patient states he will possibly go home with his aunt at discharge. Problem: Anxiety:  Goal: Level of anxiety will decrease  Description: Level of anxiety will decrease  Outcome: Ongoing  Note: Patient states he continues to feel moderately anxious this shift. Problem: Activity:  Goal: Physical symptoms of sleep deprivation will improve  Description: Physical symptoms of sleep deprivation will improve  Outcome: Ongoing  Note: Patient states he feels his sleep pattern is starting to improve. Problem: Pain:  Goal: Pain level will decrease  Description: Pain level will decrease  Outcome: Ongoing  Note: Patient denies any pain this shift. Patient states occasional tingling in fingers and elbows. Goal: Control of acute pain  Description: Control of acute pain  Outcome: Ongoing  Note: None verbalized this shift. Goal: Control of chronic pain  Description: Control of chronic pain  Outcome: Ongoing  Note: None verbalized this shift. Problem: Coping:  Goal: Ability to identify problematic behaviors that deter socialization will improve  Description: Ability to identify problematic behaviors that deter socialization will improve  Outcome: Ongoing  Note: Ongoing. Problem: KNOWLEDGE DEFICIT,EDUCATION,DISCHARGE PLAN  Goal: Knowledge - personal safety  Outcome: Ongoing  Note: Patient maintained in a safe environment. 15 minute visual checks continued. Care plan reviewed with patient.   Patient does not verbalize understanding of the plan of care and does not contribute to goal setting

## 2020-08-11 NOTE — BH NOTE
Group Therapy Note    Date: 8/10/2020  Start Time: 2000  End Time:  2020  Number of Participants: 1    Type of Group: Wrap-Up/Relaxation    Wellness Binder Information  Module Name:  None  Session Number:  None    Patient's Goal:  None    Notes:  Ongoing    Status After Intervention:  Unchanged    Participation Level: Interactive    Participation Quality: Attentive      Speech:  hesitant      Thought Process/Content: Logical      Affective Functioning: Blunted      Mood: depressed      Level of consciousness:  Alert      Response to Learning: Able to retain information      Endings: None Reported    Modes of Intervention: Education      Discipline Responsible: Registered Nurse      Signature:   Elsie Springer RN

## 2020-08-11 NOTE — PLAN OF CARE
Patient has attended at least 3 groups today and has also been out of his room for social interaction with others so he has met his socialization goal for this shift.

## 2020-08-11 NOTE — PLAN OF CARE
Problem: Depressive Behavior With or Without Suicide Precautions:  Goal: Ability to disclose and discuss suicidal ideas will improve  Description: Ability to disclose and discuss suicidal ideas will improve  8/11/2020 0903 by Krystal Pete RN  Outcome: Met This Shift  Note: Pt denied any SI at this time   8/11/2020 0043 by Eyad Betancourt RN  Outcome: Ongoing  Note: Patient denies any suicidal thoughts so far this shift. Goal: Absence of self-harm  Description: Absence of self-harm  8/11/2020 0903 by Krystal Pete RN  Outcome: Met This Shift  Note: No self harm behaviors were observed or reported so far this shift. Remains on every 15 minutes precautions for safety. 8/11/2020 0043 by Eayd Betancourt RN  Outcome: Ongoing  Note: No self harm noted so far this shift. Problem: Pain:  Goal: Pain level will decrease  Description: Pain level will decrease  8/11/2020 0903 by Krystal Pete RN  Outcome: Met This Shift  Note: Pt denied any pain at this time   8/11/2020 0043 by Eyad Betancourt RN  Outcome: Ongoing  Note: Patient denies any pain this shift. Patient states occasional tingling in fingers and elbows. Goal: Control of acute pain  Description: Control of acute pain  8/11/2020 0903 by Krystal Pete RN  Outcome: Met This Shift  8/11/2020 0043 by Eyad Betancourt RN  Outcome: Ongoing  Note: None verbalized this shift. Goal: Control of chronic pain  Description: Control of chronic pain  8/11/2020 0903 by Krystal Pete RN  Outcome: Met This Shift  8/11/2020 0043 by Eyad Betancourt RN  Outcome: Ongoing  Note: None verbalized this shift. Problem: Depressive Behavior With or Without Suicide Precautions:  Goal: Patient specific goal  Description: Patient specific goal  8/11/2020 0903 by Krystal Pete RN  Outcome: Not Met This Shift  Note: NO goal set at this time   8/11/2020 0043 by Eyad Betancourt RN  Outcome: Ongoing  Note: Patient did not state a goal today.      Problem: Depressive Behavior With or Without Suicide Precautions:  Goal: Able to verbalize support systems  Description: Able to verbalize support systems  8/11/2020 0903 by Boaz Clement RN  Outcome: Completed  Note: Patient reports his Omar Celis, as their support system. 8/11/2020 0043 by Elmer Rick RN  Outcome: Ongoing  Note: Patient states his family is his current support system. Problem: Activity:  Goal: Physical symptoms of sleep deprivation will improve  Description: Physical symptoms of sleep deprivation will improve  8/11/2020 0903 by Boaz Clement RN  Outcome: Completed  Note: Patient slept 7.5 hours last night, reports they  slept well. Encourage patient not to take naps during the day, relax several hours before bed and to take prescribed sleep meds as ordered. Patient verbalized understanding. 8/11/2020 0043 by Elmer Rick RN  Outcome: Ongoing  Note: Patient states he feels his sleep pattern is starting to improve.

## 2020-08-11 NOTE — BH NOTE
PLAN OF CARE:     Start Time: 0900  End Time:   0935    Group Topic:  Daily Goals    Group Type:   Goal Group    Intervention/Goal:  To increase support and identify daily goals    Attendance:   Attended group but was in and out. Affect:    blunt    Behavior:  Cooperative but guarded    Response:    Identified a daily goal     Daily Goal:    \"Don't be in bed so much. \"    Progress:   Progressing to goal

## 2020-08-11 NOTE — BH NOTE
Group Therapy Note    Date: 8/11/2020  Start Time:  1000  End Time:    4782    Number of Participants: 9    Type of Group: Recreational/Social Group    Patient's Goal:    Increase socialization and concentration. Notes:   Patient participated in a journal activity. Patient was in and out of group and on the fringe.      Status After Intervention:  Unchanged    Participation Level: Minimal    Participation Quality: Lethargic      Speech:  mute      Thought Process/Content:   guarded      Affective Functioning: Blunted      Mood: anxious      Level of consciousness:  Inattentive      Response to Learning: Resistant      Endings: None Reported    Modes of Intervention: Socialization and Activity      Discipline Responsible: Psychoeducational Specialist      Signature:  Tameka Herman

## 2020-08-11 NOTE — PROGRESS NOTES
Daily Progress Note  Scotty Clement MD  8/11/2020    Reviewed patient's current plan of care and vital signs with nursing staff. Sleep:  8.5 hours last night  Attending groups: Yes to one  No reported Suicidal thought; No interaction with peers & staff; he denies hallucinations or delusions but appears very paranoid. Came out of his room last night butvery minimal.    SUBJECTIVE:    Patient is feeling unchanged. SUICIDAL IDEATION denies suicidal ideation. Patient does not have medication side effects. ROS: Patient has new complaints:  No  Sleeping adequately:  Yes  Visitors: Yes  \"I am a little sad, I worried about my family\"    Mental Status Examination:  Patient is cooperative. Speech increased latency of response and soft. No abnormal movements, tics or mannerisms. Mood depressed, affect flat affect. Suicidal ideation Absent. Homicidal ideations Absent. Hallucinations Absent. Delusions Present -paranoid. Thought process les disorganized. Alert and oriented X 3. Attention and concentration fair. MEMORY intact. Insight and Judgement impaired judgment. Data   height is 5' 10\" (1.778 m) and weight is 171 lb (77.6 kg). His tympanic temperature is 97.6 °F (36.4 °C). His blood pressure is 118/76 and his pulse is 106. His respiration is 16 and oxygen saturation is 97%.    Labs:            Medications  Current Facility-Administered Medications: acetaminophen (TYLENOL) tablet 650 mg, 650 mg, Oral, Q4H PRN  ibuprofen (ADVIL;MOTRIN) tablet 400 mg, 400 mg, Oral, Q6H PRN  polyethylene glycol (GLYCOLAX) packet 17 g, 17 g, Oral, Daily PRN  hydrOXYzine (ATARAX) tablet 50 mg, 50 mg, Oral, TID PRN  traZODone (DESYREL) tablet 50 mg, 50 mg, Oral, Nightly PRN  gabapentin (NEURONTIN) capsule 100 mg, 100 mg, Oral, TID  risperiDONE (RISPERDAL) tablet 4 mg, 4 mg, Oral, Nightly  sertraline (ZOLOFT) tablet 100 mg, 100 mg, Oral, Daily  ziprasidone (GEODON) injection 10 mg, 10 mg, Intramuscular, TID PRN **AND** sterile

## 2020-08-12 ENCOUNTER — APPOINTMENT (OUTPATIENT)
Dept: MRI IMAGING | Age: 40
DRG: 885 | End: 2020-08-12
Payer: OTHER GOVERNMENT

## 2020-08-12 PROCEDURE — 70551 MRI BRAIN STEM W/O DYE: CPT

## 2020-08-12 PROCEDURE — 6370000000 HC RX 637 (ALT 250 FOR IP): Performed by: PSYCHIATRY & NEUROLOGY

## 2020-08-12 PROCEDURE — 1240000000 HC EMOTIONAL WELLNESS R&B

## 2020-08-12 RX ADMIN — TRAZODONE HYDROCHLORIDE 50 MG: 50 TABLET ORAL at 21:05

## 2020-08-12 RX ADMIN — GABAPENTIN 100 MG: 100 CAPSULE ORAL at 21:05

## 2020-08-12 RX ADMIN — GABAPENTIN 100 MG: 100 CAPSULE ORAL at 10:23

## 2020-08-12 RX ADMIN — GABAPENTIN 100 MG: 100 CAPSULE ORAL at 15:40

## 2020-08-12 RX ADMIN — RISPERIDONE 4 MG: 2 TABLET ORAL at 21:05

## 2020-08-12 RX ADMIN — SERTRALINE 200 MG: 100 TABLET, FILM COATED ORAL at 10:23

## 2020-08-12 ASSESSMENT — PAIN DESCRIPTION - PROGRESSION: CLINICAL_PROGRESSION: NOT CHANGED

## 2020-08-12 ASSESSMENT — PAIN DESCRIPTION - DESCRIPTORS: DESCRIPTORS: ACHING;DISCOMFORT

## 2020-08-12 ASSESSMENT — PAIN SCALES - GENERAL
PAINLEVEL_OUTOF10: 0
PAINLEVEL_OUTOF10: 0

## 2020-08-12 ASSESSMENT — PAIN DESCRIPTION - ONSET: ONSET: ON-GOING

## 2020-08-12 ASSESSMENT — PAIN DESCRIPTION - FREQUENCY: FREQUENCY: INTERMITTENT

## 2020-08-12 ASSESSMENT — PAIN - FUNCTIONAL ASSESSMENT: PAIN_FUNCTIONAL_ASSESSMENT: ACTIVITIES ARE NOT PREVENTED

## 2020-08-12 NOTE — PROGRESS NOTES
disorder), recurrent, severe, with psychosis ([facility] [address]     PLAN  Patient s symptoms  are improving some  Continue with current medications. Brain MRI due to family concern about glioblastoma. Attempt to develop insight  Psycho-education conducted. Supportive Therapy conducted.

## 2020-08-12 NOTE — GROUP NOTE
Group Therapy Note    Date: 8/12/2020    Group Start Time: 1630  Group End Time: 1700  Group Topic: Healthy Living/Wellness    Attendees: 11    Notes:  Patient attended group with good interaction; Watched [employer] video [email or web address]; Handouts given Supporting Someone with Depression, The Cycle of Depression, What is Depression?, What is Bipolar Disorder? Status After Intervention:  Improved    Participation Level:  Active Listener and Interactive    Participation Quality: Appropriate, Attentive, Sharing and Supportive    Speech:  normal    Thought Process/Content: Logical  Linear    Affective Functioning: Congruent and Flat    Mood: anxious and depressed    Level of consciousness:  Alert, Oriented x4 and Attentive    Response to Learning: Able to verbalize current knowledge/experience, Able to verbalize/acknowledge new learning, Able to retain information, Capable of insight, Able to change behavior and Progressing to goal    Endings: None Reported    Modes of Intervention: Education, Support, Socialization, Activity and Media    Discipline Responsible: Licensed Practical Nurse    Signature:  Ruthie Singer LPN

## 2020-08-12 NOTE — PROGRESS NOTES
Group Therapy Note    Date: 8/12/2020  Start Time:1330  End Time:  1430  Number of Participants: 8    Type of Group: Psychotherapy      Notes:  Pt is present for the group and initially engaged with peer discussion concerning the challenges of change. Pt shared his concern for family members and began to process those thoughts when he was asked to leave for scheduled medical testing. Pt left group early. Status After Intervention:  Improved but then decompensated    Participation Level:  Active Listener and Interactive    Participation Quality: Appropriate, Attentive, Sharing and Supportive      Speech:  normal      Thought Process/Content: Logical  Linear      Affective Functioning: Flat      Mood: dysphoric      Level of consciousness:  Alert, Oriented x4 and Attentive      Response to Learning: Able to verbalize current knowledge/experience, Able to verbalize/acknowledge new learning, Able to retain information, Capable of insight, Able to change behavior and Progressing to goal      Endings: None Reported    Modes of Intervention: Education, Support, Socialization, Exploration, Clarifying, Problem-solving and Activity      Discipline Responsible: /Counselor      Signature:  Grey Blake

## 2020-08-12 NOTE — PLAN OF CARE
Problem: Depressive Behavior With or Without Suicide Precautions:  Goal: Able to verbalize and/or display a decrease in depressive symptoms  Description: Able to verbalize and/or display a decrease in depressive symptoms  Outcome: Ongoing  Note: Patient states he continues to feel moderately depressed. Patient noted with a blunt affect and brightens slightly with interaction. Goal: Ability to disclose and discuss suicidal ideas will improve  Description: Ability to disclose and discuss suicidal ideas will improve  Outcome: Ongoing  Note: Patient denies any suicidal thoughts so far this shift. Goal: Absence of self-harm  Description: Absence of self-harm  Outcome: Ongoing  Note: No self harm noted so far this shift. Goal: Patient specific goal  Description: Patient specific goal  Outcome: Ongoing  Note: Patient stated his goal was to be up more. Patient continues to work on this goal.  Goal: Participates in care planning  Description: Participates in care planning  Outcome: Ongoing  Note: Care plan reviewed with patient and fair understanding verbalized. Problem: Altered Mood, Psychotic Behavior:  Goal: Able to demonstrate trust by eating, participating in treatment and following staff's direction  Description: Able to demonstrate trust by eating, participating in treatment and following staff's direction  Outcome: Ongoing  Note: Patient took 100% of an evening snack and was cooperative with staff's requests. Goal: Able to verbalize decrease in frequency and intensity of hallucinations  Description: Able to verbalize decrease in frequency and intensity of hallucinations  Outcome: Ongoing  Note: Patient denies any hallucinations this shift. Goal: Able to verbalize reality based thinking  Description: Able to verbalize reality based thinking  Outcome: Ongoing  Note: Patient is alert and oriented times 4.      Problem: Discharge Planning:  Goal: Discharged to appropriate level of care  Description: Discharged to appropriate level of care  Outcome: Ongoing  Note: Patient states he plans to return home with his aunt at discharge. Problem: Anxiety:  Goal: Level of anxiety will decrease  Description: Level of anxiety will decrease  Outcome: Ongoing  Note: Patient states he continues to feel moderately anxious this shift. Problem: Pain:  Goal: Pain level will decrease  Description: Pain level will decrease  Note: Patient denies any pain so far this shift. Goal: Control of acute pain  Description: Control of acute pain  Outcome: Completed  Goal: Control of chronic pain  Description: Control of chronic pain  Outcome: Completed     Problem: Coping:  Goal: Ability to identify problematic behaviors that deter socialization will improve  Description: Ability to identify problematic behaviors that deter socialization will improve  8/12/2020 0028 by Anahy Wolf RN  Outcome: Ongoing  Note: Ongoing. 8/11/2020 1340 by Arben Brito  Outcome: Ongoing     Problem: KNOWLEDGE DEFICIT,EDUCATION,DISCHARGE PLAN  Goal: Knowledge - personal safety  Outcome: Ongoing  Note: Patient maintained in a safe environment. 15 minute visual checks continued. Care plan reviewed with patient.   Patient does not verbalize understanding of the plan of care and does contribute to goal setting

## 2020-08-12 NOTE — PLAN OF CARE
Patient has attended one group so far today and has been out of his room at times so he is working toward his socialization goal for this shift. Patient will be encouraged to attend all groups on the unit daily during the rest of his hospital stay.

## 2020-08-12 NOTE — BH NOTE
Group Therapy Note    Date: 8/11/2020  Start Time: 2000  End Time:  2020  Number of Participants: 1    Type of Group: Wrap-Up/Relaxation    Wellness Binder Information  Module Name:  None  Session Number:  None    Patient's Goal:  To be up more    Notes:  Met    Status After Intervention:  Unchanged    Participation Level: Interactive    Participation Quality: Attentive      Speech:  hesitant      Thought Process/Content: Logical      Affective Functioning: Blunted      Mood: anxious and depressed      Level of consciousness:  Alert      Response to Learning: Able to retain information      Endings: None Reported    Modes of Intervention: Education      Discipline Responsible: Registered Nurse      Signature:   Adin Brooks RN

## 2020-08-12 NOTE — BH NOTE
PLAN OF CARE:     Start Time: 0900  End Time:   0930    Group Topic:  Daily Goals    Group Type:   Goal Group    Intervention/Goal:  To increase support and identify daily goals    Attendance:   attended      Affect:   blunt    Behavior:  Cooperative but guarded    Response:  Identified a daily goal.    Daily Goal:    To get out of my room more.      Progress:  Progressing to goal

## 2020-08-12 NOTE — PLAN OF CARE
to appropriate level of care  Outcome: Ongoing  Note: Patient discharge to appropriate level of care        Problem: Anxiety:  Goal: Level of anxiety will decrease  Description: Level of anxiety will decrease  Outcome: Ongoing  Note: Level of anxiety will decrease with medication      Problem: Pain:  Goal: Pain level will decrease  Description: Pain level will decrease  Outcome: Ongoing  Note: Pt pain level did decrease      Problem: Coping:  Goal: Ability to identify problematic behaviors that deter socialization will improve  Description: Ability to identify problematic behaviors that deter socialization will improve  8/12/2020 1623 by Cyril Vargas LPN  Outcome: Ongoing  Note: Pt able to identify problematic behaviors that deter socialization   8/12/2020 1442 by Demi Manzo  Outcome: Ongoing     Problem: KNOWLEDGE DEFICIT,EDUCATION,DISCHARGE PLAN  Goal: Knowledge - personal safety  Outcome: Ongoing  Note: Pt did not complete personal safety plan    Care plan reviewed with patient and . Patient and  verbalize understanding of the plan of care and contribute to goal setting.

## 2020-08-13 PROCEDURE — 6370000000 HC RX 637 (ALT 250 FOR IP): Performed by: PSYCHIATRY & NEUROLOGY

## 2020-08-13 PROCEDURE — 1240000000 HC EMOTIONAL WELLNESS R&B

## 2020-08-13 RX ADMIN — TRAZODONE HYDROCHLORIDE 50 MG: 50 TABLET ORAL at 21:39

## 2020-08-13 RX ADMIN — SERTRALINE 200 MG: 100 TABLET, FILM COATED ORAL at 08:15

## 2020-08-13 RX ADMIN — GABAPENTIN 100 MG: 100 CAPSULE ORAL at 08:15

## 2020-08-13 RX ADMIN — GABAPENTIN 100 MG: 100 CAPSULE ORAL at 21:39

## 2020-08-13 RX ADMIN — HYDROXYZINE HYDROCHLORIDE 50 MG: 25 TABLET ORAL at 08:15

## 2020-08-13 RX ADMIN — RISPERIDONE 4 MG: 2 TABLET ORAL at 21:39

## 2020-08-13 RX ADMIN — GABAPENTIN 100 MG: 100 CAPSULE ORAL at 17:12

## 2020-08-13 ASSESSMENT — PAIN SCALES - GENERAL
PAINLEVEL_OUTOF10: 0
PAINLEVEL_OUTOF10: 0

## 2020-08-13 NOTE — PROGRESS NOTES
Group Therapy Note    Date: 8/12/2020  Start Time: 2000  End Time:  2020    Type of Group: Wrap-Up/relaxation    Patient's Goal:  \"Walk more. \"    Notes:  Met goal.    Status After Intervention:  Improved    Participation Level: Minimal    Participation Quality: Appropriate      Speech:  normal      Thought Process/Content: Delusional      Affective Functioning: Blunted      Mood: anxious      Level of consciousness:  Alert and Attentive      Response to Learning: Able to verbalize current knowledge/experience, Able to retain information and Able to change behavior      Endings: None Reported    Modes of Intervention: Education and Support      Discipline Responsible: Registered Nurse      Signature:  Funmi Walker RN

## 2020-08-13 NOTE — BH NOTE
PLAN OF CARE:     Start Time: 0900  End Time:  0930    Group Topic:  Daily Goals    Group Type:   Goal Group    Intervention/Goal:  To increase support and identify daily goals    Attendance:  Attended group    Affect:  blunt    Behavior:  Cooperative but guarded    Response:  appropriate    Daily Goal:  To get a shower. Stay out of my room more.      Progress:  Progressing to goal

## 2020-08-13 NOTE — PLAN OF CARE
Problem: Depressive Behavior With or Without Suicide Precautions:  Goal: Able to verbalize and/or display a decrease in depressive symptoms  Description: Able to verbalize and/or display a decrease in depressive symptoms  8/13/2020 0347 by Yane Marte RN  Outcome: Ongoing  Note: Patient reports depression, states that he is \"a little sad. \"  8/12/2020 1623 by Doreatha Goltz, LPN  Outcome: Ongoing  Note: Pt able to verbalize and or display a decrease in depressive symptoms    Goal: Ability to disclose and discuss suicidal ideas will improve  Description: Ability to disclose and discuss suicidal ideas will improve  8/13/2020 0347 by Yane Marte RN  Outcome: Ongoing  Note: Patient denies suicidal thoughts. 8/12/2020 1623 by Doreatha Goltz, LPN  Outcome: Ongoing  Note: Patient able to disclose and discuss suicidal ideas will improve   Goal: Absence of self-harm  Description: Absence of self-harm  8/13/2020 0347 by Yane Marte RN  Outcome: Ongoing  Note: Patient remains safe and free from harm. 8/12/2020 1623 by Doreatha Goltz, LPN  Outcome: Ongoing  Note: No self harm behaviors were observed or reported so far this shift. Remains on every 15 minutes precautions for safety. Goal: Patient specific goal  Description: Patient specific goal  8/13/2020 0347 by Yane Marte RN  Outcome: Ongoing  Note: Daily goal met per patient. 8/12/2020 1623 by Doreatha Goltz, LPN  Outcome: Ongoing  Note: Pt did state a specific goal   Goal: Participates in care planning  Description: Participates in care planning  8/13/2020 0347 by Yane Marte RN  Outcome: Ongoing  Note: Patient participated this shift.    8/12/2020 1623 by Doreatha Goltz, LPN  Outcome: Ongoing  Note: Pt did participate in care planning      Problem: Discharge Planning:  Goal: Discharged to appropriate level of care  Description: Discharged to appropriate level of care  8/13/2020 0347 by Jani Alvarez RN  Outcome: Ongoing  Note: Discharge planning is in progress. 8/12/2020 1623 by Cyril Vargas LPN  Outcome: Ongoing  Note: Patient discharge to appropriate level of care        Problem: Anxiety:  Goal: Level of anxiety will decrease  Description: Level of anxiety will decrease  8/13/2020 0347 by Jani Alvarez RN  Outcome: Ongoing  Note: Anxiety reported, patient redirected well. 8/12/2020 1623 by Cyril Vargas LPN  Outcome: Ongoing  Note: Level of anxiety will decrease with medication      Problem: KNOWLEDGE DEFICIT,EDUCATION,DISCHARGE PLAN  Goal: Knowledge - personal safety  8/13/2020 0347 by Jani Alvarez RN  Outcome: Ongoing  Note: Safety plan not completed this shift. 8/12/2020 1623 by Cyril Vargas LPN  Outcome: Ongoing  Note: Pt did not complete personal safety plan      Problem: Altered Mood, Psychotic Behavior:  Goal: Able to demonstrate trust by eating, participating in treatment and following staff's direction  Description: Able to demonstrate trust by eating, participating in treatment and following staff's direction  8/13/2020 0347 by Jani Alvarez RN  Outcome: Ongoing  Note: Patient ate a snack this shift, participate din group and followed direction well. 8/12/2020 1623 by Cyril Vargas LPN  Outcome: Ongoing  Note: Pt able to demonstrate trust by eating and taking medication and follow staff's direction    Goal: Able to verbalize decrease in frequency and intensity of hallucinations  Description: Able to verbalize decrease in frequency and intensity of hallucinations  8/13/2020 0347 by Jani Alvarez RN  Outcome: Ongoing  Note: Patient denies hallucinations, none noted.    8/12/2020 1623 by Cyril Vargas LPN  Outcome: Ongoing  Note: Pt able to verbalize decrease in frequency and intensity of hallucinations   Goal: Able to verbalize reality based thinking  Description: Able to verbalize reality based thinking  8/13/2020 0347 by Blank Snider RN  Outcome: Ongoing  Note: Patient is paranoid at times and admits to feeling scared some times. 8/12/2020 1623 by Rolando Moreau LPN  Outcome: Ongoing  Note: Patient able to verbalize reality based thinking      Problem: Pain:  Goal: Pain level will decrease  Description: Pain level will decrease  8/13/2020 0347 by Blank Snider RN  Outcome: Ongoing  Note: Pain denied. 8/12/2020 1623 by Rolando Moreau LPN  Outcome: Ongoing  Note: Pt pain level did decrease      Problem: Coping:  Goal: Ability to identify problematic behaviors that deter socialization will improve  Description: Ability to identify problematic behaviors that deter socialization will improve  8/13/2020 0347 by Blank Snider RN  Outcome: Ongoing  8/12/2020 1623 by Rolando Moreau LPN  Outcome: Ongoing  Note: Pt able to identify problematic behaviors that deter socialization   8/12/2020 1442 by Kory Galdamez  Outcome: Ongoing  Care plan reviewed with patient.   Patient does not verbalize understanding of the plan of care and does contribute to goal setting

## 2020-08-13 NOTE — FLOWSHEET NOTE
Spiritual Support Group Note    Number of Participants in Group: 7                               Time: 1430    Goal: Relief from isolation and loneliness             Itzel Sharing             Self-understanding and gain insight              Acceptance and belonging            Recognize they are not alone                Socialization             Empowerment       Encouragement    Topic:  [x] Spiritual Wellness and Self Care                  [x] Hope                     [x] Connecting with Divine/Others        [x] Thankfulness and Gratitude               [x]  Meaningfulness and Purpose               [] Forgiveness               [x] Peace               [] Connect to [employer]     [] Other:    Participation Level:   [x] Active Listener   [] Minimal   [] Monopolizing   [x] Interactive   [] No Participation   []  Other:     Attention:   [x] Alert   [] Distractible   [] Drowsy   [] Poor   [] Other:    Manner:   [x] Cooperative   [] Suspicious   [] Withdrawn   [] Guarded   [] Irritable   [] Inhospitable   [] Other:     Others Comments from Group: Randy Landry participated in the spirituality group. He/she learned the following spiritual need:  Belonging, meaning and purpose, acceptance, values, awe. He/she has the g mini ways of wellness paper.

## 2020-08-13 NOTE — PLAN OF CARE
Patient has attended at least 2 groups today and has also been out of his room all day for social interaction with others so he has met his socialization goal for this shift.

## 2020-08-13 NOTE — PROGRESS NOTES
Group Therapy Note    Date: 8/13/2020  Start Time: 1330  End Time:  1430  Number of Participants: 8    Type of Group: Psychoeducation      Notes:  Pt is present for group with active participations. Pts all participated in a group discussion in which they identified one thing that they are very good and the qualities/traits that is required to be good at the activity. Peers all contributed to each persons identified skill/personal trait set. Discussed how those positive traits may be used for their own recovery. Status After Intervention:  Improved    Participation Level:  Active Listener and Interactive    Participation Quality: Appropriate, Attentive, Sharing and Supportive      Speech:  normal      Thought Process/Content: Logical  Linear slow to respond but becoming more organized      Affective Functioning: Congruent      Mood: dysphoric but brightens      Level of consciousness:  Alert, Oriented x4 and Attentive      Response to Learning: Able to verbalize current knowledge/experience, Able to verbalize/acknowledge new learning, Able to retain information, Capable of insight, Able to change behavior and Progressing to goal      Endings: None Reported    Modes of Intervention: Education, Support, Socialization, Exploration, Clarifying, Problem-solving and Activity      Discipline Responsible: /Counselor      Signature:  Jann Wagner

## 2020-08-13 NOTE — PROGRESS NOTES
Discharge Planning:  Clinician spoke to patient about [address] [place] [address] services. Patient would like to follow up with them. Clinician left message with mental health services at the [address] [place] [address] to set up a psych and counseling appointment for patient upon discharge. Awaiting return phone call. Patient states he will return home with his aunt.

## 2020-08-13 NOTE — PLAN OF CARE
Problem: Depressive Behavior With or Without Suicide Precautions:  Goal: Able to verbalize and/or display a decrease in depressive symptoms  Description: Able to verbalize and/or display a decrease in depressive symptoms  8/13/2020 1342 by Devin Guerra RN  Outcome: Ongoing  Note: Patient reports mood 1/10 with 10 being normal. Has flat affect. Speech clear. Fair eye contact. Reports hope for future and identifies Aunt Lindsey as hissupport system. Problem: Depressive Behavior With or Without Suicide Precautions:  Goal: Ability to disclose and discuss suicidal ideas will improve  Description: Ability to disclose and discuss suicidal ideas will improve  8/13/2020 1342 by Devin Guerra RN  Outcome: Ongoing  Note: Patient denies suicidal ideations, no plan or intent to harm self. Patient remains on suicidal precautions 15 checks for safety. Instructed to seek staff as needed for thoughts of self harm. Problem: Depressive Behavior With or Without Suicide Precautions:  Goal: Absence of self-harm  Description: Absence of self-harm  8/13/2020 1342 by Devin Guerra RN  Outcome: Ongoing  Note: No self harm behaviors were observed or reported so far this shift. Remains on every 15 minutes precautions for safety. Problem: Depressive Behavior With or Without Suicide Precautions:  Goal: Patient specific goal  Description: Patient specific goal  8/13/2020 1342 by Devin Guerra RN  Outcome: Ongoing  Note: Patient reports goal for today is to [facility]". Patient continues to work towards goal.     Problem: Depressive Behavior With or Without Suicide Precautions:  Goal: Participates in care planning  Description: Participates in care planning  8/13/2020 1342 by Devin Guerra RN  Outcome: Ongoing  Note: Patient discussed treatment plan with physician/medical staff, attending group, and compliant with medications.        Problem: Altered Mood, Psychotic Behavior:  Goal: Able to demonstrate trust by eating, participating in treatment and following staff's direction  Description: Able to demonstrate trust by eating, participating in treatment and following staff's direction  8/13/2020 1342 by Katy Ford RN  Outcome: Ongoing  Note: Patient eating meals, taking medications, and following staff's direction. Problem: Altered Mood, Psychotic Behavior:  Goal: Able to verbalize decrease in frequency and intensity of hallucinations  Description: Able to verbalize decrease in frequency and intensity of hallucinations  8/13/2020 1342 by Katy Ford RN  Outcome: Ongoing  Note: Patient denies hallucinations at present time. Problem: Altered Mood, Psychotic Behavior:  Goal: Able to verbalize reality based thinking  Description: Able to verbalize reality based thinking  8/13/2020 1342 by Katy Ford RN  Outcome: Ongoing  Note: Patient alert and oriented x 3, not to situation. Problem: Discharge Planning:  Goal: Discharged to appropriate level of care  Description: Discharged to appropriate level of care  8/13/2020 1342 by Katy Ford RN  Outcome: Ongoing  Note: Patient voices no needs before discharge. Patient plans to be discharged to home with Aunt Lindsey. Discharge planner working with patient to achieve optimal discharge plans, specific to individual needs. Problem: Anxiety:  Goal: Level of anxiety will decrease  Description: Level of anxiety will decrease  8/13/2020 1342 by Katy Ford RN  Outcome: Ongoing  Note: Patient reports anxiety. Pt taking Atarax prn. Verbalized medication was effective. Problem: Pain:  Goal: Pain level will decrease  Description: Pain level will decrease  8/13/2020 1342 by Katy Ford RN  Outcome: Ongoing  Note: Pain Assessment: 0-10  Pain Level: 0   Patient's Stated Pain Goal: No pain   Is pain goal met at this time? Yes  Patient denies pain so far this shift.      Problem: KNOWLEDGE DEFICIT,EDUCATION,DISCHARGE

## 2020-08-13 NOTE — PROGRESS NOTES
Daily Progress Note  Rd Martinez MD  8/13/2020    Reviewed patient's current plan of care and vital signs with nursing staff. Sleep:  8.5 hours last night  Attending groups: Yes   No reported Suicidal thought; Limited interaction with peers & staff but out more; he is still feeling sad and anxious but less paranoid. His thought process is still somewhat disorganized. SUBJECTIVE:    Patient is feeling slightly better. SUICIDAL IDEATION denies suicidal ideation. Patient does not have medication side effects. ROS: Patient has new complaints:  No  Sleeping adequately:  Yes  Visitors: Yes      Mental Status Examination:  Patient is cooperative. Speech normal pitch and normal volume but soft. No abnormal movements, tics or mannerisms. Mood depressed, affect flat affect. Suicidal ideation Absent. Homicidal ideations Absent. Hallucinations Absent. Delusions Present -less paranoid. Thought process less disorganized. Alert and oriented X 3. Attention and concentration fair. MEMORY intact. Insight and Judgement impaired judgment. Data   height is 5' 10\" (1.778 m) and weight is 171 lb (77.6 kg). His oral temperature is 98.3 °F (36.8 °C). His blood pressure is 125/85 and his pulse is 76. His respiration is 16 and oxygen saturation is 97%.    Labs:            Medications  Current Facility-Administered Medications: sertraline (ZOLOFT) tablet 200 mg, 200 mg, Oral, Daily  acetaminophen (TYLENOL) tablet 650 mg, 650 mg, Oral, Q4H PRN  ibuprofen (ADVIL;MOTRIN) tablet 400 mg, 400 mg, Oral, Q6H PRN  polyethylene glycol (GLYCOLAX) packet 17 g, 17 g, Oral, Daily PRN  hydrOXYzine (ATARAX) tablet 50 mg, 50 mg, Oral, TID PRN  traZODone (DESYREL) tablet 50 mg, 50 mg, Oral, Nightly PRN  gabapentin (NEURONTIN) capsule 100 mg, 100 mg, Oral, TID  risperiDONE (RISPERDAL) tablet 4 mg, 4 mg, Oral, Nightly  ziprasidone (GEODON) injection 10 mg, 10 mg, Intramuscular, TID PRN **AND** sterile water injection 1.2 mL, 1.2 mL, Intramuscular, TID PRN    ASSESSMENT  MDD (major depressive disorder), recurrent, severe, with psychosis ([facility] [address]     PLAN  Patient s symptoms  are improving some  Continue with current medications. Attempt to develop insight  Psycho-education conducted. Supportive Therapy conducted. I spent over 20 minutes on the phone with patient's aunt discussing his diagnosis and treatment plan. We also discussed the results of his brain MRI.

## 2020-08-14 PROCEDURE — 6370000000 HC RX 637 (ALT 250 FOR IP): Performed by: PSYCHIATRY & NEUROLOGY

## 2020-08-14 PROCEDURE — 1240000000 HC EMOTIONAL WELLNESS R&B

## 2020-08-14 RX ADMIN — GABAPENTIN 100 MG: 100 CAPSULE ORAL at 20:43

## 2020-08-14 RX ADMIN — GABAPENTIN 100 MG: 100 CAPSULE ORAL at 15:16

## 2020-08-14 RX ADMIN — RISPERIDONE 4 MG: 2 TABLET ORAL at 20:43

## 2020-08-14 RX ADMIN — GABAPENTIN 100 MG: 100 CAPSULE ORAL at 08:51

## 2020-08-14 RX ADMIN — SERTRALINE 200 MG: 100 TABLET, FILM COATED ORAL at 08:51

## 2020-08-14 RX ADMIN — TRAZODONE HYDROCHLORIDE 50 MG: 50 TABLET ORAL at 20:43

## 2020-08-14 RX ADMIN — HYDROXYZINE HYDROCHLORIDE 50 MG: 25 TABLET ORAL at 20:43

## 2020-08-14 ASSESSMENT — PAIN SCALES - GENERAL
PAINLEVEL_OUTOF10: 0
PAINLEVEL_OUTOF10: 0

## 2020-08-14 NOTE — PLAN OF CARE
Problem: Depressive Behavior With or Without Suicide Precautions:  Goal: Able to verbalize and/or display a decrease in depressive symptoms  Description: Able to verbalize and/or display a decrease in depressive symptoms  8/14/2020 0046 by Best Corona RN  Outcome: Ongoing  Note: Patient denies current depression or sadness. 8/13/2020 1342 by Emily Choi RN  Outcome: Ongoing  Note: Patient reports mood 1/10 with 10 being normal. Has flat affect. Speech clear. Fair eye contact. Reports hope for future and identifies Aunt Lindsey as hissupport system. Goal: Ability to disclose and discuss suicidal ideas will improve  Description: Ability to disclose and discuss suicidal ideas will improve  8/14/2020 0046 by Best Corona RN  Outcome: Ongoing  Note: Patient denies suicidal thoughts. 8/13/2020 1342 by Emily Choi RN  Outcome: Ongoing  Note: Patient denies suicidal ideations, no plan or intent to harm self. Patient remains on suicidal precautions 15 checks for safety. Instructed to seek staff as needed for thoughts of self harm. Goal: Absence of self-harm  Description: Absence of self-harm  8/14/2020 0046 by Best Corona RN  Outcome: Ongoing  Note: Patient remains safe and free from harm. 8/13/2020 1342 by Emily Choi RN  Outcome: Ongoing  Note: No self harm behaviors were observed or reported so far this shift. Remains on every 15 minutes precautions for safety. Goal: Patient specific goal  Description: Patient specific goal  8/14/2020 0046 by Best Corona RN  Outcome: Ongoing  Note: Daily goal met per patient. 8/13/2020 1342 by Emily Choi RN  Outcome: Ongoing  Note: Patient reports goal for today is to [facility]". Patient continues to work towards goal.  Goal: Participates in care planning  Description: Participates in care planning  8/14/2020 0046 by Best Corona RN  Outcome: Ongoing  Note: Patient participated this shift.    8/13/2020 1342 by Uriel Maldonado Beth Garcia RN  Outcome: Ongoing  Note: Patient discussed treatment plan with physician/medical staff, attending group, and compliant with medications. Problem: Discharge Planning:  Goal: Discharged to appropriate level of care  Description: Discharged to appropriate level of care  8/14/2020 0046 by Alesia Munoz RN  Outcome: Ongoing  Note: Discharge planning is in progress. 8/13/2020 1342 by Bhavya Calhoun RN  Outcome: Ongoing  Note: Patient voices no needs before discharge. Patient plans to be discharged to home with Aunt Lindsey. Discharge planner working with patient to achieve optimal discharge plans, specific to individual needs. Problem: Anxiety:  Goal: Level of anxiety will decrease  Description: Level of anxiety will decrease  8/14/2020 0046 by Alesia Munoz RN  Outcome: Ongoing  Note: Patient reports \"a little bit of anxiety. \"  8/13/2020 1342 by Bhavya Calhoun RN  Outcome: Ongoing  Note: Patient reports anxiety. Pt taking Atarax prn. Verbalized medication was effective. Problem: KNOWLEDGE DEFICIT,EDUCATION,DISCHARGE PLAN  Goal: Knowledge - personal safety  8/14/2020 0046 by Alesia Munoz RN  Outcome: Ongoing  Note: Safety plan not completed this shift. 8/13/2020 1342 by Bhavya Calhoun RN  Outcome: Ongoing  Note: Maintained in safe and secure environment. Patient did not fill out safety plan this shift. Problem: Altered Mood, Psychotic Behavior:  Goal: Able to demonstrate trust by eating, participating in treatment and following staff's direction  Description: Able to demonstrate trust by eating, participating in treatment and following staff's direction  8/14/2020 0046 by Alesia Munoz RN  Outcome: Ongoing  Note: Patient is eating well, participating and following direction. 8/13/2020 1342 by Bhavya Calhoun RN  Outcome: Ongoing  Note: Patient eating meals, taking medications, and following staff's direction.   Goal: Able to verbalize decrease in frequency and intensity of hallucinations  Description: Able to verbalize decrease in frequency and intensity of hallucinations  8/14/2020 0046 by Best Corona RN  Outcome: Ongoing  Note: Hallucinations denied. 8/13/2020 1342 by Emily Choi RN  Outcome: Ongoing  Note: Patient denies hallucinations at present time. Goal: Able to verbalize reality based thinking  Description: Able to verbalize reality based thinking  8/14/2020 0046 by Best Corona RN  Outcome: Ongoing  8/13/2020 1342 by Emily Choi RN  Outcome: Ongoing  Note: Patient alert and oriented x 3, not to situation. Problem: Pain:  Goal: Pain level will decrease  Description: Pain level will decrease  8/14/2020 0046 by Best Corona RN  Outcome: Ongoing  Note: Patient denies pain. 8/13/2020 1342 by Emily Choi RN  Outcome: Ongoing  Note: Pain Assessment: 0-10  Pain Level: 0   Patient's Stated Pain Goal: No pain   Is pain goal met at this time? Yes  Patient denies pain so far this shift. Problem: Coping:  Goal: Ability to identify problematic behaviors that deter socialization will improve  Description: Ability to identify problematic behaviors that deter socialization will improve  8/14/2020 0046 by Best Corona RN  Outcome: Ongoing  8/13/2020 1458 by Magy Goldberg  Outcome: Met This Shift  8/13/2020 1342 by Emily Choi RN  Outcome: Ongoing  Care plan reviewed with patient.   Patient does verbalize understanding of the plan of care and does contribute to goal setting

## 2020-08-14 NOTE — BH NOTE
PLAN OF CARE:     Start Time: 0900  End Time:  0930    Group Topic:  Daily Goals    Group Type:   Goal Group    Intervention/Goal:  To increase support and identify daily goals    Attendance:   Participated in group    Affect:   blunt    Behavior:    cooperative and guarded    Response:   appropriate    Daily Goal:     Stay out of my room more.      Progress:   Progressing to goal

## 2020-08-14 NOTE — PROGRESS NOTES
Group Therapy Note    Date: 8/13/2020  Start Time: 2000  End Time:  2020    Type of Group: Wrap-Up/relaxation      Patient's Goal:  \"shower\"    Notes:  Met goal    Status After Intervention:  Improved    Participation Level: Minimal    Participation Quality: Appropriate      Speech:  normal      Thought Process/Content: Logical      Affective Functioning: Blunted      Mood: anxious      Level of consciousness:  Alert and Attentive      Response to Learning: Able to verbalize current knowledge/experience, Able to verbalize/acknowledge new learning, Capable of insight and Able to change behavior      Endings: None Reported    Modes of Intervention: Education and Support      Discipline Responsible: Registered Nurse      Signature:  Bettie Bravo RN

## 2020-08-14 NOTE — PROGRESS NOTES
Daily Progress Note  Hang Bagley MD  8/14/2020    Reviewed patient's current plan of care and vital signs with nursing staff. Sleep:  8 hours last night  Attending groups: Yes   No reported Suicidal thought; Limited interaction with peers & staff but out more; he is still having some paranoia but overall he is doing much better. Thought process is less disorganized. He is able to carry a decent conversation. SUBJECTIVE:    Patient is feeling slightly better. SUICIDAL IDEATION denies suicidal ideation. Patient does not have medication side effects. ROS: Patient has new complaints:  No  Sleeping adequately:  Yes  Visitors: Yes  \"I feel better\"    Mental Status Examination:  Patient is cooperative. Speech normal pitch and normal volume but soft. No abnormal movements, tics or mannerisms. Mood dysthymic, affect Restricted. Suicidal ideation Absent. Homicidal ideations Absent. Hallucinations Absent. Delusions not prominent. Thought process less disorganized. Alert and oriented X 3. Attention and concentration fair. MEMORY intact. Insight and Judgement impaired judgment. Data   height is 5' 10\" (1.778 m) and weight is 171 lb (77.6 kg). His oral temperature is 96.4 °F (35.8 °C). His blood pressure is 119/72 and his pulse is 64. His respiration is 16 and oxygen saturation is 98%.    Labs:            Medications  Current Facility-Administered Medications: sertraline (ZOLOFT) tablet 200 mg, 200 mg, Oral, Daily  acetaminophen (TYLENOL) tablet 650 mg, 650 mg, Oral, Q4H PRN  ibuprofen (ADVIL;MOTRIN) tablet 400 mg, 400 mg, Oral, Q6H PRN  polyethylene glycol (GLYCOLAX) packet 17 g, 17 g, Oral, Daily PRN  hydrOXYzine (ATARAX) tablet 50 mg, 50 mg, Oral, TID PRN  traZODone (DESYREL) tablet 50 mg, 50 mg, Oral, Nightly PRN  gabapentin (NEURONTIN) capsule 100 mg, 100 mg, Oral, TID  risperiDONE (RISPERDAL) tablet 4 mg, 4 mg, Oral, Nightly  ziprasidone (GEODON) injection 10 mg, 10 mg, Intramuscular, TID PRN **AND** sterile water injection 1.2 mL, 1.2 mL, Intramuscular, TID PRN    ASSESSMENT  MDD (major depressive disorder), recurrent, severe, with psychosis [facility]     PLAN  Patient s symptoms  are improving   Continue with current medications. Attempt to develop insight  Psycho-education conducted. Supportive Therapy conducted.

## 2020-08-14 NOTE — PLAN OF CARE
Problem: Depressive Behavior With or Without Suicide Precautions:  Goal: Able to verbalize and/or display a decrease in depressive symptoms  Description: Able to verbalize and/or display a decrease in depressive symptoms  Outcome: Met This Shift  Note: Denies feeling depressed      Problem: Depressive Behavior With or Without Suicide Precautions:  Goal: Ability to disclose and discuss suicidal ideas will improve  Description: Ability to disclose and discuss suicidal ideas will improve  Outcome: Ongoing  Note: Denies SI     Problem: Depressive Behavior With or Without Suicide Precautions:  Goal: Absence of self-harm  Description: Absence of self-harm  Outcome: Ongoing  Note: Denies      Problem: Depressive Behavior With or Without Suicide Precautions:  Goal: Patient specific goal  Description: Patient specific goal  Outcome: Ongoing  Note: Set goal to stay out of room      Problem: Depressive Behavior With or Without Suicide Precautions:  Goal: Participates in care planning  Description: Participates in care planning  Outcome: Ongoing  Note: Takes part in care planning      Problem: Altered Mood, Psychotic Behavior:  Goal: Able to demonstrate trust by eating, participating in treatment and following staff's direction  Description: Able to demonstrate trust by eating, participating in treatment and following staff's direction  Outcome: Ongoing  Note: Takes meds per orders      Problem: Altered Mood, Psychotic Behavior:  Goal: Able to verbalize decrease in frequency and intensity of hallucinations  Description: Able to verbalize decrease in frequency and intensity of hallucinations  Outcome: Ongoing  Note: Denies hallucinations      Problem: Altered Mood, Psychotic Behavior:  Goal: Able to verbalize reality based thinking  Description: Able to verbalize reality based thinking  Outcome: Ongoing  Note: Alert and oriented x 3.       Problem: Discharge Planning:  Goal: Discharged to appropriate level of care  Description:

## 2020-08-15 PROCEDURE — 1240000000 HC EMOTIONAL WELLNESS R&B

## 2020-08-15 PROCEDURE — 6370000000 HC RX 637 (ALT 250 FOR IP): Performed by: PSYCHIATRY & NEUROLOGY

## 2020-08-15 RX ADMIN — TRAZODONE HYDROCHLORIDE 50 MG: 50 TABLET ORAL at 20:39

## 2020-08-15 RX ADMIN — SERTRALINE 200 MG: 100 TABLET, FILM COATED ORAL at 07:41

## 2020-08-15 RX ADMIN — GABAPENTIN 100 MG: 100 CAPSULE ORAL at 20:39

## 2020-08-15 RX ADMIN — GABAPENTIN 100 MG: 100 CAPSULE ORAL at 07:41

## 2020-08-15 RX ADMIN — RISPERIDONE 4 MG: 2 TABLET ORAL at 20:39

## 2020-08-15 RX ADMIN — GABAPENTIN 100 MG: 100 CAPSULE ORAL at 13:37

## 2020-08-15 ASSESSMENT — PAIN SCALES - GENERAL: PAINLEVEL_OUTOF10: 0

## 2020-08-15 NOTE — GROUP NOTE
Group Therapy Note    Date: 8/15/2020    Group Start Time: 5508  Group End Time: 1450  Group Topic: Psychotherapy    [facility]    SILVIANO Yun        Group Therapy Note: Today's group focused on stress management. Participant talked about stress vulnerability, how they deal with stress and what stress they struggle with the most.      Attendees: 3         Patient's Goal:  Take medicine here and at home, See psychiatrist on an outpatient basis. Notes:  Patient was present throughout group and actively participated in conversation. Patient shared sometime sleeping, watching tv or being active as ways that he tries to deal with his stress. He shares that he cant really watch the new because then his stress goes up because everything he would normally see is not good. Status After Intervention:  Improved    Participation Level:  Active Listener    Participation Quality: Appropriate, Attentive, Sharing and Supportive      Speech:  normal      Thought Process/Content: Logical      Affective Functioning: Congruent      Mood: euthymic      Level of consciousness:  Alert, Oriented x4 and Attentive      Response to Learning: Able to verbalize current knowledge/experience, Able to verbalize/acknowledge new learning, Able to retain information, Capable of insight and Able to change behavior      Endings: None Reported    Modes of Intervention: Education, Exploration and Clarifying      Discipline Responsible: /Counselor      Signature:  SILVIANO Serna

## 2020-08-15 NOTE — PATIENT CARE CONFERENCE
[address] [place] [address]  Day 7/Weekly Interdisciplinary Treatment Plan NOTE    Patient was in treatment team    Admission Type:   Admission Type: Voluntary    Reason for admission:  Reason for Admission: \"did something I am ashamed off, I lost my family and kids, my job\"  Estimated Length of Stay Update:  7-10 days   Estimated Discharge Date Update: 1-3 days     PATIENT STRENGTHS:  Patient Strengths Strengths: No significant Physical Illness(per epic)  Patient Strengths and Limitations:Limitations: Difficult relationships / poor social skills, Tendency to isolate self, General negative or hopeless attitude about future/recovery, Hopeless about future(per epic)  Addictive Behavior:Addictive Behavior  In the past 3 months, have you felt or has someone told you that you have a problem with:  : None(per epic)  Do you have a history of Chemical Use?: No(per epic)  Do you have a history of Alcohol Use?: No(per epic)  Do you have a history of Street Drug Abuse?: No(per epic)  Histroy of Prescripton Drug Abuse?: No(per epic)  Medical Problems:  Past Medical History:   Diagnosis Date    \"Kidney problem\"     Anxiety     Depression     Neuropathy        Risk:  Fall RiskTotal: 65  John Scale John Scale Score: 22  BVC Total: 0      Status EXAM:   Status and Exam  Normal: No  Facial Expression: Flat  Affect: Appropriate  Level of Consciousness: Alert  Mood:Normal: Yes  Mood: Depressed  Motor Activity:Normal: Yes  Motor Activity: Decreased  Interview Behavior: Cooperative  Preception: Emporium to Person, Emporium to Time, Emporium to Place, Em[place]ium to Situation  Attention:Normal: Yes  Attention: Unable to Concentrate  Thought Processes: Blocking  Thought Content:Normal: Yes  Thought Content: Paranoia  Hallucinations: None  Delusions: No  Delusions:  Other(See Comment)  Memory:Normal: Yes  Memory: Poor Recent  Insight and Judgment: Yes  Insight and Judgment: Poor Judgment, Poor Insight  Present Suicidal Ideation: No  Present

## 2020-08-15 NOTE — PLAN OF CARE
Problem: Depressive Behavior With or Without Suicide Precautions:  Goal: Able to verbalize and/or display a decrease in depressive symptoms  Description: Able to verbalize and/or display a decrease in depressive symptoms  8/14/2020 2317 by Sherwin Sainz RN  Outcome: Ongoing  Note: Pt denies depression but does report some anxiety  8/14/2020 1811 by Juana Walter RN  Outcome: Met This Shift  Note: Denies feeling depressed   Goal: Ability to disclose and discuss suicidal ideas will improve  Description: Ability to disclose and discuss suicidal ideas will improve  8/14/2020 2317 by Sherwin Sainz RN  Outcome: Ongoing  Note: Pt denies self harm thoughts  8/14/2020 1811 by Juana Walter RN  Outcome: Ongoing  Note: Denies SI  Goal: Absence of self-harm  Description: Absence of self-harm  8/14/2020 2317 by Sherwin Sainz RN  Outcome: Ongoing  Note: Pt denies self harm thoughts  8/14/2020 1811 by Juana Walter RN  Outcome: Ongoing  Note: Denies   Goal: Patient specific goal  Description: Patient specific goal  8/14/2020 2317 by Sherwin Sainz RN  Outcome: Ongoing  Note: Pt met goal of coming out of room more  8/14/2020 1811 by Juana Walter RN  Outcome: Ongoing  Note: Set goal to stay out of room   Goal: Participates in care planning  Description: Participates in care planning  8/14/2020 2317 by Sherwin Sainz RN  Outcome: Ongoing  Note: Pt is taking medications, attending and participating in groups and care planning.  Pt has good interaction with staff and peers   8/14/2020 1811 by Juana Walter RN  Outcome: Ongoing  Note: Takes part in care planning      Problem: Discharge Planning:  Goal: Discharged to appropriate level of care  Description: Discharged to appropriate level of care  8/14/2020 2317 by Sherwin Sainz RN  Outcome: Ongoing  Note: Pt plans to be discharged to a[facility] and [facility]  8/14/2020 1811 by Juana Walter RN  Outcome: Ongoing  Note: Ongoing      Problem: Anxiety:  Goal: Level of anxiety will decrease  Description: Level of anxiety will decrease  8/14/2020 2317 by Juana Dove RN  Outcome: Ongoing  Note: Pt denies depression but does report some anxiety  8/14/2020 1811 by Danae Burgos RN  Outcome: Ongoing  Note: Reports \"some\" anxiety      Problem: KNOWLEDGE DEFICIT,EDUCATION,DISCHARGE PLAN  Goal: Knowledge - personal safety  8/14/2020 2317 by Juana Dove RN  Outcome: Ongoing  Note: Pt remained safe and free of harm  8/14/2020 1811 by Danae Burgos RN  Outcome: Ongoing     Problem: Altered Mood, Psychotic Behavior:  Goal: Able to demonstrate trust by eating, participating in treatment and following staff's direction  Description: Able to demonstrate trust by eating, participating in treatment and following staff's direction  8/14/2020 2317 by Juana Dove RN  Outcome: Ongoing  Note: Pt is taking medications, attending and participating in groups and care planning. Pt has good interaction with staff and peers   8/14/2020 1811 by Danae Burgos RN  Outcome: Ongoing  Note: Takes meds per orders   Goal: Able to verbalize decrease in frequency and intensity of hallucinations  Description: Able to verbalize decrease in frequency and intensity of hallucinations  8/14/2020 2317 by Juana Dove RN  Outcome: Ongoing  Note: Pt denies hallucinations but does admit to some paranoia  8/14/2020 1811 by Danae Burgos RN  Outcome: Ongoing  Note: Denies hallucinations   Goal: Able to verbalize reality based thinking  Description: Able to verbalize reality based thinking  8/14/2020 2317 by Juana Dove RN  Outcome: Ongoing  Note: Pt alert and oriented  8/14/2020 1811 by Danae Burgos RN  Outcome: Ongoing  Note: Alert and oriented x 3.       Problem: Pain:  Goal: Pain level will decrease  Description: Pain level will decrease  8/14/2020 2317 by Juana Dove RN  Outcome: Ongoing  Note: Pt denies pain or discomfort  8/14/2020 1811 by Danae Burgos RN  Outcome: Ongoing     Problem: Coping:  Goal: Ability to identify problematic behaviors that deter socialization will improve  Description: Ability to identify problematic behaviors that deter socialization will improve  8/14/2020 2317 by Hussain Brumfield RN  Outcome: Ongoing  Note: Pt interact well with peers  8/14/2020 1811 by Puja Morales RN  Outcome: Ongoing  Note: Working toward identifying problematic behaviors   8/14/2020 1448 by Bella Del Rio  Outcome: Met This Shift   Care plan reviewed with patient and  verbalize understanding of the plan of care and contribute to goal setting.

## 2020-08-15 NOTE — PLAN OF CARE
Problem: Role Relationship:  Goal: Ability to demonstrate positive changes in social behaviors and relationships will improve  Description: Ability to demonstrate positive changes in social behaviors and relationships will improve  Outcome: Met This Shift  Note: Pt has attended 3/3 groups that have been offered on the unit this shift. Pt has been out on the unit and has been interacting with peers. Pt will be encouraged to continue group attendance and social interaction. Pt has met socialization goal for this shift.

## 2020-08-15 NOTE — PROGRESS NOTES
Daily Progress Note  Donald Wright MD  8/15/2020    Reviewed patient's current plan of care and vital signs with nursing staff. Sleep:  8 hours last night  Attending groups: Yes   No reported Suicidal thought; good interaction with peers & staff; he has slight paranoia yesterday. Thought process is more appropriate. Mood 8 on a scale of 1 to 10 with 10 is feeling normal    SUBJECTIVE:    Patient is feeling better. SUICIDAL IDEATION denies suicidal ideation. Patient does not have medication side effects. ROS: Patient has new complaints:  No  Sleeping adequately:  Yes  Visitors: Yes      Mental Status Examination:  Patient is cooperative. Speech normal pitch and normal volume but soft. No abnormal movements, tics or mannerisms. Mood dysthymic, affect Restricted. Suicidal ideation Absent. Homicidal ideations Absent. Hallucinations Absent. Delusions not prominent. Thought process less disorganized. Alert and oriented X 3. Attention and concentration fair. MEMORY intact. Insight and Judgement impaired judgment. Data   height is 5' 10\" (1.778 m) and weight is 171 lb (77.6 kg). His tympanic temperature is 97.6 °F (36.4 °C). His blood pressure is 119/75 and his pulse is 75. His respiration is 17 and oxygen saturation is 98%.    Labs:            Medications  Current Facility-Administered Medications: sertraline (ZOLOFT) tablet 200 mg, 200 mg, Oral, Daily  acetaminophen (TYLENOL) tablet 650 mg, 650 mg, Oral, Q4H PRN  ibuprofen (ADVIL;MOTRIN) tablet 400 mg, 400 mg, Oral, Q6H PRN  polyethylene glycol (GLYCOLAX) packet 17 g, 17 g, Oral, Daily PRN  hydrOXYzine (ATARAX) tablet 50 mg, 50 mg, Oral, TID PRN  traZODone (DESYREL) tablet 50 mg, 50 mg, Oral, Nightly PRN  gabapentin (NEURONTIN) capsule 100 mg, 100 mg, Oral, TID  risperiDONE (RISPERDAL) tablet 4 mg, 4 mg, Oral, Nightly  ziprasidone (GEODON) injection 10 mg, 10 mg, Intramuscular, TID PRN **AND** sterile water injection 1.2 mL, 1.2 mL, Intramuscular, TID PRN    ASSESSMENT  MDD (major depressive disorder), recurrent, severe, with psychosis ([facility] [address]     PLAN  Patient s symptoms  are improving   Continue with current medications. Attempt to develop insight  Psycho-education conducted. Supportive Therapy conducted.       Patient Location:  [place] [facility]     [facility] ([facility]

## 2020-08-15 NOTE — PLAN OF CARE
Problem: Altered Mood, Psychotic Behavior:  Goal: Able to verbalize decrease in frequency and intensity of hallucinations  Description: Able to verbalize decrease in frequency and intensity of hallucinations  8/15/2020 0839 by Mini Oreilly RN  Outcome: Met This Shift  Note: Pt denied any hallucinations   8/14/2020 2317 by Lyndsey Coley RN  Outcome: Ongoing  Note: Pt denies hallucinations but does admit to some paranoia     Problem: Pain:  Goal: Pain level will decrease  Description: Pain level will decrease  8/15/2020 0839 by Mini Oreilly RN  Outcome: Met This Shift  Note: Pt denied any pain  8/14/2020 2317 by Lyndsey Coley RN  Outcome: Ongoing  Note: Pt denies pain or discomfort     Problem: Depressive Behavior With or Without Suicide Precautions:  Goal: Able to verbalize and/or display a decrease in depressive symptoms  Description: Able to verbalize and/or display a decrease in depressive symptoms  8/15/2020 0839 by Mini Oreilly RN  Outcome: Ongoing  Note: Pt affect flat but brightens with interaction  8/14/2020 2317 by Lyndsey Coley RN  Outcome: Ongoing  Note: Pt denies depression but does report some anxiety  Goal: Patient specific goal  Description: Patient specific goal  8/15/2020 0839 by Mini Oreilly RN  Outcome: Ongoing  Note: Patient reports goal for today is to get ou of my room more   8/14/2020 2317 by Lyndsey Coley RN  Outcome: Ongoing  Note: Pt met goal of coming out of room more  Goal: Participates in care planning  Description: Participates in care planning  8/15/2020 0839 by Mini Oreilly RN  Outcome: Ongoing  Note: Patient discussed treatment plan with physician/medical staff, attending group, and compliant with medications. 8/14/2020 2317 by Lyndsey Coley RN  Outcome: Ongoing  Note: Pt is taking medications, attending and participating in groups and care planning.  Pt has good interaction with staff and peers      Problem: Discharge Planning:  Goal: Discharged to appropriate level of care  Description: Discharged to appropriate level of care  8/15/2020 0839 by Marivel Baker RN  Outcome: Ongoing  Note: Patient voices emotional  needs before discharge. Patient plans to be discharged to home . Discharge planner working with patient to achieve optimal discharge plans, specific to individual needs. 8/14/2020 2317 by Christopher Bey RN  Outcome: Ongoing  Note: Pt plans [facility] be [facility] to [facility]     Problem: Anxiety:  Goal: Level of anxiety will decrease  Description: Level of anxiety will decrease  8/15/2020 0839 by Marivel Baker RN  Outcome: Ongoing  Note: Pt denied any anxiety but appears anxious at times  8/14/2020 2317 by Christopher Bey RN  Outcome: Ongoing  Note: Pt denies depression but does report some anxiety     Problem: KNOWLEDGE DEFICIT,EDUCATION,DISCHARGE PLAN  Goal: Knowledge - personal safety  8/15/2020 0839 by Marivel Baker RN  Outcome: Ongoing  Note: Maintained in safe and secure environment. 8/14/2020 2317 by Christopher Bey RN  Outcome: Ongoing  Note: Pt remained safe and free of harm     Problem: Altered Mood, Psychotic Behavior:  Goal: Able to demonstrate trust by eating, participating in treatment and following staff's direction  Description: Able to demonstrate trust by eating, participating in treatment and following staff's direction  8/15/2020 0839 by Marivel Baker RN  Outcome: Ongoing  Note: Pt eating without issues or concerns   8/14/2020 2317 by Christopher Bey RN  Outcome: Ongoing  Note: Pt is taking medications, attending and participating in groups and care planning.  Pt has good interaction with staff and peers   Goal: Able to verbalize reality based thinking  Description: Able to verbalize reality based thinking  8/15/2020 0839 by Marivel Baker RN  Outcome: Ongoing  Note: Pt orientated to all  8/14/2020 2317 by Christopher Bey RN  Outcome: Ongoing  Note: Pt alert and oriented     Problem: Depressive Behavior With or Without Suicide Precautions:  Goal: Ability to disclose and discuss suicidal ideas will improve  Description: Ability to disclose and discuss suicidal ideas will improve  8/15/2020 0839 by Luciano Medrano RN  Outcome: Completed  8/14/2020 2317 by Angelique Valdovinos RN  Outcome: Ongoing  Note: Pt denies self harm thoughts  Goal: Absence of self-harm  Description: Absence of self-harm  8/15/2020 0839 by Luciano Medrano RN  Outcome: Completed  8/14/2020 2317 by Angelique Valdovinos RN  Outcome: Ongoing  Note: Pt denies self harm thoughts     Problem: Coping:  Goal: Ability to identify problematic behaviors that deter socialization will improve  Description: Ability to identify problematic behaviors that deter socialization will improve  8/15/2020 0839 by Luciano Medrano RN  Outcome: Completed  Note: Pt interacting well with staff and peers  8/14/2020 2317 by Angelique Valdovinos RN  Outcome: Ongoing  Note: Pt interact well with peers

## 2020-08-16 PROCEDURE — 6370000000 HC RX 637 (ALT 250 FOR IP): Performed by: PSYCHIATRY & NEUROLOGY

## 2020-08-16 PROCEDURE — 1240000000 HC EMOTIONAL WELLNESS R&B

## 2020-08-16 RX ADMIN — TRAZODONE HYDROCHLORIDE 50 MG: 50 TABLET ORAL at 20:34

## 2020-08-16 RX ADMIN — HYDROXYZINE HYDROCHLORIDE 50 MG: 25 TABLET ORAL at 07:50

## 2020-08-16 RX ADMIN — GABAPENTIN 100 MG: 100 CAPSULE ORAL at 20:34

## 2020-08-16 RX ADMIN — RISPERIDONE 4 MG: 2 TABLET ORAL at 20:34

## 2020-08-16 RX ADMIN — GABAPENTIN 100 MG: 100 CAPSULE ORAL at 07:49

## 2020-08-16 RX ADMIN — HYDROXYZINE HYDROCHLORIDE 50 MG: 25 TABLET ORAL at 20:34

## 2020-08-16 RX ADMIN — SERTRALINE 200 MG: 100 TABLET, FILM COATED ORAL at 07:49

## 2020-08-16 RX ADMIN — GABAPENTIN 100 MG: 100 CAPSULE ORAL at 13:48

## 2020-08-16 ASSESSMENT — PAIN SCALES - GENERAL
PAINLEVEL_OUTOF10: 0
PAINLEVEL_OUTOF10: 0

## 2020-08-16 NOTE — GROUP NOTE
Group Therapy Note    Date: 8/16/2020    Group Start Time: 1430  Group End Time: 0032  Group Topic: Healthy Living/Wellness    [facility]    Vibha Donovan LPN        Group Therapy Note    Attendees: 6           Notes:  attended    Status After Intervention:  Improved    Participation Level:  Active Listener    Participation Quality: Appropriate and Attentive      Speech:  normal      Thought Process/Content: Logical      Affective Functioning: Flat      Level of consciousness:  Alert, Oriented x4 and Attentive      Response to Learning: Able to verbalize/acknowledge new learning      Endings: None Reported    Modes of Intervention: Education, Socialization and Problem-solving      Discipline Responsible: Licensed Practical Nurse      Signature:  Vibha Donovan LPN

## 2020-08-16 NOTE — PROGRESS NOTES
Daily Progress Note  Stanford Baum MD  8/16/2020    Reviewed patient's current plan of care and vital signs with nursing staff. Sleep:  7.5 hours last night  Attending groups: Yes   No reported Suicidal thought; good interaction with peers & staff; His thought process continues to improve. Mood 5 on a scale of 1 to 10 with 10 is feeling normal. Patient's aunt called and would like to fly to [place] with patient. He wants to go to [place] to visit his children. SUBJECTIVE:    Patient is feeling better. SUICIDAL IDEATION denies suicidal ideation. Patient does not have medication side effects. ROS: Patient has new complaints:  No  Sleeping adequately:  Yes  Visitors: Yes      Mental Status Examination:  Patient is cooperative. Speech normal pitch and normal volume but soft. No abnormal movements, tics or mannerisms. Mood dysthymic, affect Restricted. Suicidal ideation Absent. Homicidal ideations Absent. Hallucinations Absent. Delusions not prominent. Thought process Goal oriented. Alert and oriented X 3. Attention and concentration fair. MEMORY intact. Insight and Judgement fair. Data   height is 5' 10\" (1.778 m) and weight is 171 lb (77.6 kg). His oral temperature is 97.8 °F (36.6 °C). His blood pressure is 131/84 and his pulse is 88. His respiration is 17 and oxygen saturation is 98%.    Labs:            Medications  Current Facility-Administered Medications: sertraline (ZOLOFT) tablet 200 mg, 200 mg, Oral, Daily  acetaminophen (TYLENOL) tablet 650 mg, 650 mg, Oral, Q4H PRN  ibuprofen (ADVIL;MOTRIN) tablet 400 mg, 400 mg, Oral, Q6H PRN  polyethylene glycol (GLYCOLAX) packet 17 g, 17 g, Oral, Daily PRN  hydrOXYzine (ATARAX) tablet 50 mg, 50 mg, Oral, TID PRN  traZODone (DESYREL) tablet 50 mg, 50 mg, Oral, Nightly PRN  gabapentin (NEURONTIN) capsule 100 mg, 100 mg, Oral, TID  risperiDONE (RISPERDAL) tablet 4 mg, 4 mg, Oral, Nightly  ziprasidone (GEODON) injection 10 mg, 10 mg, Intramuscular, TID PRN **AND** sterile water injection 1.2 mL, 1.2 mL, Intramuscular, TID PRN    ASSESSMENT  MDD (major depressive disorder), recurrent, severe, with psychosis ([facility] [address]     PLAN  Patient s symptoms  are improving   Continue with current medications. Attempt to develop insight  Psycho-education conducted. Supportive Therapy conducted.   Probable discharge is tomorrow  Follow-up @ [address] [facility]      [facility]     Provider Location ([facility]/State):   Keke Segura [place]

## 2020-08-16 NOTE — PROGRESS NOTES
Discharge Planning 1039-I attempted  To call the [facility] and leave a message requesting a telephone call Monday morning for discharge planning. I was transferred to the [facility]. They were unable to assist in leaving a message. They did provide the telephone number for the main Mental Health office in [place]. [phone].

## 2020-08-16 NOTE — PLAN OF CARE
Problem: Depressive Behavior With or Without Suicide Precautions:  Goal: Able to verbalize and/or display a decrease in depressive symptoms  Description: Able to verbalize and/or display a decrease in depressive symptoms  8/15/2020 2140 by Lyndsay Sultana RN  Outcome: Met This Shift  Note: States depression is better rates his mood a 4-5 with 10 being the best. Affect flat. Good eye contact. States he is hopeful for the future. Good peer interaction. 8/15/2020 0839 by Marivel Baker RN  Outcome: Ongoing  Note: Pt affect flat but brightens with interaction  Goal: Ability to disclose and discuss suicidal ideas will improve  Description: Ability to disclose and discuss suicidal ideas will improve  8/15/2020 0839 by Marivel Baker RN  Outcome: Completed  Goal: Absence of self-harm  Description: Absence of self-harm  8/15/2020 0839 by Marivel Baker RN  Outcome: Completed  Goal: Patient specific goal  Description: Patient specific goal  8/15/2020 2140 by Lyndsay Sultana RN  Outcome: Met This Shift  Note: To get out of room more   8/15/2020 0839 by Marivel Baker RN  Outcome: Ongoing  Note: Patient reports goal for today is to get ou of my room more   Goal: Participates in care planning  Description: Participates in care planning  8/15/2020 2140 by Lyndsay Sultana RN  Outcome: Met This Shift  Note: Care plan reviewed with patient and verbalize understanding of the plan of care and contribute to goal setting. 8/15/2020 0839 by Marivel Baker RN  Outcome: Ongoing  Note: Patient discussed treatment plan with physician/medical staff, attending group, and compliant with medications.       Problem: Altered Mood, Psychotic Behavior:  Goal: Able to demonstrate trust by eating, participating in treatment and following staff's direction  Description: Able to demonstrate trust by eating, participating in treatment and following staff's direction  8/15/2020 2140 by Lyndsay Sultana RN  Outcome: Met This Shift  Note: Continues to be slightly paranoid when staff was asking assessment questions. Eating well, taking medications as ordered and follows directions as needed. 8/15/2020 0839 by Kati Mitchell RN  Outcome: Ongoing  Note: Pt eating without issues or concerns   Goal: Able to verbalize decrease in frequency and intensity of hallucinations  Description: Able to verbalize decrease in frequency and intensity of hallucinations  8/15/2020 2140 by Dustin Jamil RN  Outcome: Met This Shift  Note: Denies hallucinations this shift. 8/15/2020 0839 by Kati Mitchell RN  Outcome: Met This Shift  Note: Pt denied any hallucinations   Goal: Able to verbalize reality based thinking  Description: Able to verbalize reality based thinking  8/15/2020 2140 by Dustin Jamil RN  Outcome: Completed  Note: Alert and oriented x 4   8/15/2020 0839 by Kati Mitchell RN  Outcome: Ongoing  Note: Pt orientated to all     Problem: Discharge Planning:  Goal: Discharged to appropriate level of care  Description: Discharged to appropriate level of care  8/15/2020 2140 by Dustin Jamil RN  Outcome: Ongoing  Note: Discharge planning in progress. Plans on returning back with aunt and following up at [facility]   8/15/2020 4198 by Kati Mitchell RN  Outcome: Ongoing  Note: Patient voices emotional  needs before discharge. Patient plans to be discharged to home . Discharge planner working with patient to achieve optimal discharge plans, specific to individual needs. Problem: Anxiety:  Goal: Level of anxiety will decrease  Description: Level of anxiety will decrease  8/15/2020 2140 by Dustin Jamil RN  Outcome: Met This Shift  Note: States he is \"alittle anxious\". Denies need for prn anxiety medication this shift.    8/15/2020 0839 by Kati Mitchell RN  Outcome: Ongoing  Note: Pt denied any anxiety but appears anxious at times     Problem: Pain:  Goal: Pain level will decrease  Description: Pain level will decrease  8/15/2020 2140 by Shirin Esteban RN  Outcome: Met This Shift  Note: Denies pain this shift. 8/15/2020 0839 by Dougie Tanner RN  Outcome: Met This Shift  Note: Pt denied any pain     Problem: Coping:  Goal: Ability to identify problematic behaviors that deter socialization will improve  Description: Ability to identify problematic behaviors that deter socialization will improve  8/15/2020 0839 by Dougie Tanner RN  Outcome: Completed  Note: Pt interacting well with staff and peers     Problem: Role Relationship:  Goal: Ability to demonstrate positive changes in social behaviors and relationships will improve  Description: Ability to demonstrate positive changes in social behaviors and relationships will improve  8/15/2020 1439 by Yin Preciado  Outcome: Met This Shift  Note: Pt has attended 3/3 groups that have been offered on the unit this shift. Pt has been out on the unit and has been interacting with peers. Pt will be encouraged to continue group attendance and social interaction. Pt has met socialization goal for this shift. Problem: KNOWLEDGE DEFICIT,EDUCATION,DISCHARGE PLAN  Goal: Knowledge - personal safety  8/15/2020 2140 by Shirin Esteban RN  Outcome: Not Met This Shift  Note: Encouraged to complete safety plan before discharge. 8/15/2020 0839 by Dougie Tanner RN  Outcome: Ongoing  Note: Maintained in safe and secure environment.

## 2020-08-16 NOTE — PROGRESS NOTES
This RN has reviewed and agrees with Isak Salas LPN's data collection and has colaborated with this LPN regarding the patient's care plan.

## 2020-08-16 NOTE — GROUP NOTE
Group Therapy Note    Date: 8/16/2020    Group Start Time: 1000  Group End Time: 6782  Group Topic: Recreational    [facility]    GHADA Castano    Group Therapy Note    Attendees: 7         Patient's Goal:  Pt will increase socialization and coping skills through participation of recreation therapy focusing on music as a coping mechanism. Notes:  Pt displayed active participation. Pt identified songs that helped with stressors, along with favorites. Pt participated in discussion and socialized with peers. Pt was appropriate and pleasant. Status After Intervention:  Improved    Participation Level:  Active Listener and Interactive    Participation Quality: Appropriate, Attentive and Sharing      Speech:  normal      Thought Process/Content: Logical      Affective Functioning: Congruent      Mood: euthymic      Level of consciousness:  Alert, Oriented x4 and Attentive      Response to Learning: Able to verbalize current knowledge/experience, Able to verbalize/acknowledge new learning, Able to retain information, Capable of insight, Able to change behavior and Progressing to goal      Endings: None Reported    Modes of Intervention: Education, Support, Socialization, Exploration, Clarifying, Activity and Media      Discipline Responsible: Psychoeducational Specialist      Signature:  GHADA Castano

## 2020-08-16 NOTE — PROGRESS NOTES
Group Therapy Note    Date: 8/15/2020  Start Time: 2000  End Time:  2020  Number of Participants:     Type of Group: Wrap-Up    Wellness Binder Information  Module Name:    Session Number:      Patient's Goal:  To get out of room more     Notes:  Goal met     Status After Intervention:  Unchanged    Participation Level:  Active Listener and Interactive    Participation Quality: Appropriate      Speech:  normal      Thought Process/Content: Logical      Affective Functioning: Flat      Mood: anxious and depressed      Level of consciousness:  Alert      Response to Learning: Able to verbalize current knowledge/experience, Able to verbalize/acknowledge new learning and Able to retain information      Endings: None Reported    Modes of Intervention: Support and Socialization      Discipline Responsible: Registered Nurse      Signature:  Alysa Bustillos RN

## 2020-08-16 NOTE — PLAN OF CARE
Problem: Depressive Behavior With or Without Suicide Precautions:  Goal: Participates in care planning  Description: Participates in care planning  8/16/2020 1115 by Rajani Price LPN  Outcome: Met This Shift  Note: Patient participates in care planning  8/15/2020 2140 by Jasmin Manriquez RN  Outcome: Met This Shift  Note: Care plan reviewed with patient and verbalize understanding of the plan of care and contribute to goal setting. Problem: Discharge Planning:  Goal: Discharged to appropriate level of care  Description: Discharged to appropriate level of care  8/16/2020 1115 by Rajani Price LPN  Outcome: Met This Shift  Note: Patient to be discharged home with his aunt and follow up with [facility]  8/15/2020 2140 by Jasmin Manriquez RN  Outcome: Ongoing  Note: Discharge planning in progress. Plans on returning back with aunt and following up at [facility]      Problem: Altered Mood, Psychotic Behavior:  Goal: Able to demonstrate trust by eating, participating in treatment and following staff's direction  Description: Able to demonstrate trust by eating, participating in treatment and following staff's direction  8/16/2020 1115 by Rajani Price LPN  Outcome: Met This Shift  Note: Patient eating well, follow's directions and participates in treatment  8/15/2020 2140 by Jasmin Manriquez RN  Outcome: Met This Shift  Note: Continues to be slightly paranoid when staff was asking assessment questions. Eating well, taking medications as ordered and follows directions as needed. Goal: Able to verbalize decrease in frequency and intensity of hallucinations  Description: Able to verbalize decrease in frequency and intensity of hallucinations  8/16/2020 1115 by Rajani Price LPN  Outcome: Met This Shift  Note: Denies hallucinations  8/15/2020 2140 by Jasmin Manriquez RN  Outcome: Met This Shift  Note: Denies hallucinations this shift.       Problem: Pain:  Goal: Pain level will decrease  Description: Pain level will decrease  8/16/2020 1115 by Jovany Polk LPN  Outcome: Met This Shift  Note: Denies pain  8/15/2020 2140 by Shirin Esteban RN  Outcome: Met This Shift  Note: Denies pain this shift. Problem: Role Relationship:  Goal: Ability to demonstrate positive changes in social behaviors and relationships will improve  Description: Ability to demonstrate positive changes in social behaviors and relationships will improve  8/16/2020 1121 by [id number]  Outcome: Met This Shift  Note: Pt attended 2/2 groups that have been offered on the unit so far this shift. Pt has been out on the unit interacting with peers. Pt will be encouraged to continue group attendance and social interaction. Pt met the socialization goal for this shift. 8/16/2020 1115 by Jovany Polk LPN  Outcome: Ongoing     Problem: Depressive Behavior With or Without Suicide Precautions:  Goal: Able to verbalize and/or display a decrease in depressive symptoms  Description: Able to verbalize and/or display a decrease in depressive symptoms  8/16/2020 1115 by Jovany Polk LPN  Outcome: Ongoing  Note: Patient verbalizes that his depressive symptoms are improving, will continue to monitor  8/15/2020 2140 by Shirin Esteban RN  Outcome: Met This Shift  Note: States depression is better rates his mood a 4-5 with 10 being the best. Affect flat. Good eye contact. States he is hopeful for the future. Good peer interaction.    Goal: Patient specific goal  Description: Patient specific goal  8/16/2020 1115 by Jovany Polk LPN  Outcome: Ongoing  Note: Goal:  take a shower  8/15/2020 2140 by Shirin Esteban RN  Outcome: Met This Shift  Note: To get out of room more      Problem: Anxiety:  Goal: Level of anxiety will decrease  Description: Level of anxiety will decrease  8/16/2020 1115 by Jovany Polk LPN  Outcome: Ongoing  Note: Medication given for anxiety, see MAR  8/15/2020 2140 by Shirin Esteban RN  Outcome: Met This Shift  Note: States

## 2020-08-17 VITALS
SYSTOLIC BLOOD PRESSURE: 116 MMHG | RESPIRATION RATE: 16 BRPM | HEART RATE: 80 BPM | HEIGHT: 70 IN | WEIGHT: 171 LBS | TEMPERATURE: 97.3 F | OXYGEN SATURATION: 98 % | DIASTOLIC BLOOD PRESSURE: 74 MMHG | BODY MASS INDEX: 24.48 KG/M2

## 2020-08-17 PROCEDURE — 6370000000 HC RX 637 (ALT 250 FOR IP): Performed by: PSYCHIATRY & NEUROLOGY

## 2020-08-17 PROCEDURE — 5130000000 HC BRIDGE APPOINTMENT

## 2020-08-17 RX ORDER — HYDROXYZINE 50 MG/1
50 TABLET, FILM COATED ORAL 3 TIMES DAILY PRN
Qty: 90 TABLET | Refills: 0 | Status: SHIPPED | OUTPATIENT
Start: 2020-08-17 | End: 2020-09-16

## 2020-08-17 RX ORDER — TRAZODONE HYDROCHLORIDE 50 MG/1
50 TABLET ORAL NIGHTLY PRN
Qty: 30 TABLET | Refills: 0 | Status: SHIPPED | OUTPATIENT
Start: 2020-08-17

## 2020-08-17 RX ORDER — RISPERIDONE 4 MG/1
4 TABLET, FILM COATED ORAL NIGHTLY
Qty: 30 TABLET | Refills: 0 | Status: SHIPPED | OUTPATIENT
Start: 2020-08-17

## 2020-08-17 RX ORDER — SERTRALINE HYDROCHLORIDE 100 MG/1
200 TABLET, FILM COATED ORAL DAILY
Qty: 60 TABLET | Refills: 0 | Status: SHIPPED | OUTPATIENT
Start: 2020-08-18

## 2020-08-17 RX ADMIN — GABAPENTIN 100 MG: 100 CAPSULE ORAL at 13:43

## 2020-08-17 RX ADMIN — GABAPENTIN 100 MG: 100 CAPSULE ORAL at 08:25

## 2020-08-17 RX ADMIN — SERTRALINE 200 MG: 100 TABLET, FILM COATED ORAL at 08:25

## 2020-08-17 ASSESSMENT — PAIN - FUNCTIONAL ASSESSMENT: PAIN_FUNCTIONAL_ASSESSMENT: ACTIVITIES ARE NOT PREVENTED

## 2020-08-17 ASSESSMENT — PAIN SCALES - GENERAL: PAINLEVEL_OUTOF10: 0

## 2020-08-17 NOTE — PLAN OF CARE
Problem: Role Relationship:  Goal: Ability to demonstrate positive changes in social behaviors and relationships will improve  Description: Ability to demonstrate positive changes in social behaviors and relationships will improve  8/17/2020 0912 by Aisha Leonardo  Outcome: Completed

## 2020-08-17 NOTE — PLAN OF CARE
Problem: Depressive Behavior With or Without Suicide Precautions:  Goal: Able to verbalize and/or display a decrease in depressive symptoms  Description: Able to verbalize and/or display a decrease in depressive symptoms  8/16/2020 2224 by Yves Oneal RN  Outcome: Met This Shift  Note: States that he has some depression and feels sad but states it's better. Affect flat. Good eye contact. States he is hopeful for the future. Good peer interaction. 8/16/2020 1115 by David Everett LPN  Outcome: Ongoing  Note: Patient verbalizes that his depressive symptoms are improving, will continue to monitor  Goal: Patient specific goal  Description: Patient specific goal  8/16/2020 2224 by Yves Oneal RN  Outcome: Not Met This Shift  Note: To take a shower   8/16/2020 1115 by David Everett LPN  Outcome: Ongoing  Note: Goal:  take a shower  Goal: Participates in care planning  Description: Participates in care planning  8/16/2020 2224 by Yves Oneal RN  Outcome: Met This Shift  Note: Care plan reviewed with patient and verbalize understanding of the plan of care and contribute to goal setting. 8/16/2020 1115 by David Everett LPN  Outcome: Met This Shift  Note: Patient participates in care planning     Problem: Altered Mood, Psychotic Behavior:  Goal: Able to demonstrate trust by eating, participating in treatment and following staff's direction  Description: Able to demonstrate trust by eating, participating in treatment and following staff's direction  8/16/2020 2224 by Yves Oneal RN  Outcome: Met This Shift  Note: Eating well, following directions as needed and taking medications as ordered.    8/16/2020 1115 by David Everett LPN  Outcome: Met This Shift  Note: Patient eating well, follow's directions and participates in treatment  Goal: Able to verbalize decrease in frequency and intensity of hallucinations  Description: Able to verbalize decrease in frequency and intensity of hallucinations  8/16/2020 2224 by Sophia Mendoza RN  Outcome: Met This Shift  Note: Denies hallucinations this shift. 8/16/2020 1115 by Anand Ray LPN  Outcome: Met This Shift  Note: Denies hallucinations     Problem: Discharge Planning:  Goal: Discharged to appropriate level of care  Description: Discharged to appropriate level of care  8/16/2020 2224 by Sophia Mendoza RN  Outcome: Ongoing  Note: Discharge planning in progress. Plans on going back with aunt and follow up at [facility]   8/16/2020 1115 by Anand Ray LPN  Outcome: Met This Shift  Note: Patient to be discharged home with his aunt and follow up with [facility]     Problem: Anxiety:  Goal: Level of anxiety will decrease  Description: Level of anxiety will decrease  8/16/2020 2224 by Sophia Mendoza RN  Outcome: Met This Shift  Note: States he continues to have some anxiety was given atarax with relief. 8/16/2020 1115 by Anand Ray LPN  Outcome: Ongoing  Note: Medication given for anxiety, see MAR     Problem: Pain:  Goal: Pain level will decrease  Description: Pain level will decrease  8/16/2020 2224 by Sophia Mendoza RN  Outcome: Met This Shift  Note: Denies pain this shift. 8/16/2020 1115 by Anand Ray LPN  Outcome: Met This Shift  Note: Denies pain     Problem: Role Relationship:  Goal: Ability to demonstrate positive changes in social behaviors and relationships will improve  Description: Ability to demonstrate positive changes in social behaviors and relationships will improve  8/16/2020 1121 by Oanh Pickett  Outcome: Met This Shift  Note: Pt attended 2/2 groups that have been offered on the unit so far this shift. Pt has been out on the unit interacting with peers. Pt will be encouraged to continue group attendance and social interaction. Pt met the socialization goal for this shift.   8/16/2020 1121 by Oanh Pickett  Outcome: Met This Shift  Note: Pt attended 2/2 groups that have been offered on the unit so far this

## 2020-08-17 NOTE — PROGRESS NOTES
Discharge Planning 1152-Placed a second call to [facility] and requested a return telephone call.

## 2020-08-17 NOTE — PROGRESS NOTES
Discharge Planning-I discussed with Verna Virgilio his follow up options based on network provider list. [facility] in [facility] for Psychiatry. Verna Rileykaren is to call [facility] to schedule his initial appointment. [facility] requires that the individual call to schedule follow up.

## 2020-08-17 NOTE — PROGRESS NOTES
Daily Progress Note  Stephon Corea MD  8/17/2020    Reviewed patient's current plan of care and vital signs with nursing staff. Sleep:  8.5 hours last night  Attending groups: Yes   No reported Suicidal thought; good interaction with peers & staff;  Mood 5 yesterday, 7 today on a scale of 1 to 10 with 10 is feeling normal.  He has some anxiety about leaving this unit and going to [place]. SUBJECTIVE:    Patient is feeling better. SUICIDAL IDEATION denies suicidal ideation. Patient does not have medication side effects. ROS: Patient has new complaints:  No  Sleeping adequately:  Yes  Visitors: Yes      Mental Status Examination:  Patient is cooperative. Speech normal pitch and normal volume. No abnormal movements, tics or mannerisms. Mood dysthymic, affect Restricted. Suicidal ideation Absent. Homicidal ideations Absent. Hallucinations Absent. Delusions Absent. Thought process Goal oriented. Alert and oriented X 3. Attention and concentration fair. MEMORY intact. Insight and Judgement fair. Data   height is 5' 10\" (1.778 m) and weight is 171 lb (77.6 kg). His oral temperature is 96.9 °F (36.1 °C). His blood pressure is 130/77 and his pulse is 85. His respiration is 16 and oxygen saturation is 97%.    Labs:            Medications  Current Facility-Administered Medications: sertraline (ZOLOFT) tablet 200 mg, 200 mg, Oral, Daily  acetaminophen (TYLENOL) tablet 650 mg, 650 mg, Oral, Q4H PRN  ibuprofen (ADVIL;MOTRIN) tablet 400 mg, 400 mg, Oral, Q6H PRN  polyethylene glycol (GLYCOLAX) packet 17 g, 17 g, Oral, Daily PRN  hydrOXYzine (ATARAX) tablet 50 mg, 50 mg, Oral, TID PRN  traZODone (DESYREL) tablet 50 mg, 50 mg, Oral, Nightly PRN  gabapentin (NEURONTIN) capsule 100 mg, 100 mg, Oral, TID  risperiDONE (RISPERDAL) tablet 4 mg, 4 mg, Oral, Nightly  ziprasidone (GEODON) injection 10 mg, 10 mg, Intramuscular, TID PRN **AND** sterile water injection 1.2 mL, 1.2 mL, Intramuscular, TID PRN    ASSESSMENT  MDD (major depressive disorder), recurrent, severe, with psychosis ([facility]     PLAN  Patient s symptoms  are improving   Continue with current medications. Attempt to develop insight  Psycho-education conducted. Supportive Therapy conducted.   Probable discharge is today  Follow-up @ [place]

## 2020-08-17 NOTE — GROUP NOTE
Group Therapy Note    Date: 8/17/2020    Group Start Time: 0930  Group End Time: 1000  Group Topic: [employer]    [facility] 4E    HGADA Potts    Group Therapy Note    Attendees: 8         Patient's Goal:  \"Wait to get released\"    Notes:  Pt progress is ongoing. Status After Intervention:  Improved    Participation Level:  Active Listener and Interactive    Participation Quality: Appropriate, Attentive and Sharing      Speech:  normal      Thought Process/Content: Logical      Affective Functioning: Congruent      Mood: euthymic      Level of consciousness:  Alert, Oriented x4 and Attentive      Response to Learning: Able to verbalize current knowledge/experience, Able to verbalize/acknowledge new learning, Able to retain information, Capable of insight, Able to change behavior and Progressing to goal      Endings: None Reported    Modes of Intervention: Education, Support, Socialization, Exploration, Clarifying, Activity and Limit-setting      Discipline Responsible: Psychoeducational Specialist      Signature:  GHADA Jordan

## 2020-08-17 NOTE — PROGRESS NOTES
Group Therapy Note    Date: 8/16/2020  Start Time: 2000  End Time:  2020  Number of Participants:     Type of Group: Wrap-Up    Wellness Binder Information  Module Name:    Session Number:      Patient's Goal:  To take a shower     Notes: Working towards goal     Status After Intervention:  Unchanged    Participation Level:  Active Listener and Interactive    Participation Quality: Appropriate      Speech:  normal      Thought Process/Content: Logical      Affective Functioning: Flat      Mood: anxious and depressed      Level of consciousness:  Alert      Response to Learning: Able to verbalize current knowledge/experience, Able to verbalize/acknowledge new learning and Able to retain information      Endings: None Reported    Modes of Intervention: Support and Socialization      Discipline Responsible: Registered Nurse      Signature:  Rukhsana Gray RN 22-Aug-2019 17:44

## 2020-08-17 NOTE — PROGRESS NOTES
Discharge Planning-Faxed referral information to [facility]. Pt is to call to scheduled follow up care.

## 2020-08-17 NOTE — PROGRESS NOTES
CLINICAL PHARMACY NOTE: MEDS TO [address] Select Patient?: No  Total # of Prescriptions Filled: 4   The following medications were delivered to the patient:  Hydroxyzine 50 mg  Risperidone 2 mg  Trazodone 50 mg  Sertraline 100 mg  Total # of Interventions Completed: 2  Time Spent (min): 30    Additional Documentation:

## 2020-08-17 NOTE — BH NOTE
Gayle Irvin R.N. has reviewed and agrees with Nima Alicia LPN's shift   Assessment.     Mohamud Tuttle  8/17/2020

## 2020-08-17 NOTE — GROUP NOTE
Group Therapy Note    Date: 8/17/2020    Group Start Time: 1030  Group End Time: 1115  Group Topic: Recreational    [facility] 4E    GHADA Potts    Group Therapy Note    Attendees: 4         Patient's Goal:  Pt will improve socialization and knowledge of coping skills through recreation therapy with a focus on mindfulness. Notes:  Pt was cooperative and pleasant. Pt had active participation in group and contributed to conversation when prompted. Pt discussed different characteristics on knowing if someone is not listening to him, as well as if he is not listening to them. Status After Intervention:  Improved    Participation Level:  Active Listener and Interactive    Participation Quality: Appropriate, Attentive and Sharing      Speech:  normal      Thought Process/Content: Logical      Affective Functioning: Flat      Mood: euthymic      Level of consciousness:  Alert, Oriented x4 and Attentive      Response to Learning: Able to verbalize current knowledge/experience, Able to verbalize/acknowledge new learning, Able to retain information, Capable of insight, Able to change behavior and Progressing to goal      Endings: None Reported    Modes of Intervention: Education, Support, Socialization, Exploration, Clarifying, Activity and Confrontation      Discipline Responsible: Psychoeducational Specialist      Signature:  GHADA Reagan

## 2020-08-17 NOTE — DISCHARGE SUMMARY
Physician Discharge Summary     Patient ID:  Ana Lynch  [id number]  9 y.o.  1980    Admit date: 8/8/2020    Discharge date and time: 8/17/2020  8:38 AM     Admitting Physician: Lora Cabrera MD     Discharge Physician: Rob Campbell MD      Admission Diagnoses: Major depression with psychotic features ([facility] [F32.3]  MDD (major depressive disorder), recurrent, severe, with psychosis ([facility] [F33.3]    IDENTIFYING INFORMATION: The patient is a 51-year-old    male. He is the father of two girls. He lives with his aunt and uncle. He is unemployed. HISTORY OF PRESENT ILLNESS: The patient was brought to [address] [place] [address] ED by his aunt. He cut himself superficially in his left  arm. He told the  in the emergency room that he hears  voices at times, but he is not sure if it is his own thoughts or not. He was not able to talk about circumstances that brought him to the  emergency room, but the  noted that he was feeling  depressed and hopeless and was feeling bad about himself and he has  trouble concentrating, etc.  I attempted to see him earlier and it was  not feasible since he wanted to lay in bed. He was in bed, crying. He  finally came to see me, but he can only give me very limited  information. He could not stop talking about missing his daughters who  are in [place]. He admits though that he is depressed, and he is crying a  lot throughout the assessment. He kept talking about his daughters and  he wanted to write them a letter, but his daughters are only 1and 3 years old.     I decided to call his aunt who brought him to the emergency room to ask  for background information. According to the patient's aunt, he has no  history of depression until he got  about a year ago. She  reports that the divorce was unexpected and was very dramatic. She  feels since then he has been depressed.   His aunt is upset since she  thought that the examination, evaluation, counseling and review of medications and discharge plan: More than 30 minutes. Engagement: Patient displayed a good level of engagement with the treatments offered during this admission.        Signed:  Electronically signed by Portillo Lema MD on 8/17/2020 at 8:38 AM

## 2020-08-17 NOTE — PROGRESS NOTES
[facility] does not accept pt insurance since he is out of [facility]. I called [facility] and requested a return call. s [facility] and Eulalia mireles are in pt network.

## 2020-08-18 ENCOUNTER — TELEPHONE (OUTPATIENT)
Dept: PSYCHIATRY | Age: 40
End: 2020-08-18

## 2020-12-04 ENCOUNTER — HOSPITAL ENCOUNTER (OUTPATIENT)
Age: 40
Setting detail: SPECIMEN
Discharge: HOME | End: 2020-12-04
Payer: OTHER GOVERNMENT

## 2020-12-04 PROCEDURE — U0003 INFECTIOUS AGENT DETECTION BY NUCLEIC ACID (DNA OR RNA); SEVERE ACUTE RESPIRATORY SYNDROME CORONAVIRUS 2 (SARS-COV-2) (CORONAVIRUS DISEASE [COVID-19]), AMPLIFIED PROBE TECHNIQUE, MAKING USE OF HIGH THROUGHPUT TECHNOLOGIES AS DESCRIBED BY CMS-2020-01-R: HCPCS

## 2020-12-06 LAB — SARS-COV-2: NOT DETECTED

## 2025-04-09 NOTE — PLAN OF CARE
Problem: Role Relationship:  Goal: Ability to demonstrate positive changes in social behaviors and relationships will improve  Description: Ability to demonstrate positive changes in social behaviors and relationships will improve  Outcome: Met This Shift  Note: Pt attended 2/2 groups that have been offered on the unit so far this shift. Pt has been out on the unit interacting with peers. Pt will be encouraged to continue group attendance and social interaction. Pt met the socialization goal for this shift. no